# Patient Record
Sex: FEMALE | Race: WHITE | NOT HISPANIC OR LATINO | Employment: OTHER | URBAN - METROPOLITAN AREA
[De-identification: names, ages, dates, MRNs, and addresses within clinical notes are randomized per-mention and may not be internally consistent; named-entity substitution may affect disease eponyms.]

---

## 2017-09-28 RX ORDER — LISINOPRIL AND HYDROCHLOROTHIAZIDE 20; 25 MG/1; MG/1
1 TABLET ORAL DAILY
Qty: 30 TABLET | Refills: 0 | Status: SHIPPED | OUTPATIENT
Start: 2017-09-28 | End: 2017-10-22 | Stop reason: SDUPTHER

## 2017-10-23 RX ORDER — LISINOPRIL AND HYDROCHLOROTHIAZIDE 20; 25 MG/1; MG/1
TABLET ORAL
Qty: 30 TABLET | Refills: 0 | Status: SHIPPED | OUTPATIENT
Start: 2017-10-23 | End: 2017-11-24 | Stop reason: SDUPTHER

## 2017-11-24 RX ORDER — LISINOPRIL AND HYDROCHLOROTHIAZIDE 20; 25 MG/1; MG/1
TABLET ORAL
Qty: 30 TABLET | Refills: 0 | Status: SHIPPED | OUTPATIENT
Start: 2017-11-24 | End: 2018-02-06

## 2017-11-24 RX ORDER — LISINOPRIL AND HYDROCHLOROTHIAZIDE 20; 25 MG/1; MG/1
TABLET ORAL
Qty: 30 TABLET | Refills: 0 | Status: SHIPPED | OUTPATIENT
Start: 2017-11-24 | End: 2018-02-06 | Stop reason: SDUPTHER

## 2018-02-01 RX ORDER — LISINOPRIL AND HYDROCHLOROTHIAZIDE 20; 25 MG/1; MG/1
TABLET ORAL
Qty: 30 TABLET | Refills: 0 | OUTPATIENT
Start: 2018-02-01

## 2018-02-06 ENCOUNTER — OFFICE VISIT (OUTPATIENT)
Dept: PRIMARY CARE CLINIC | Facility: CLINIC | Age: 63
End: 2018-02-06
Payer: MEDICAID

## 2018-02-06 VITALS
OXYGEN SATURATION: 97 % | SYSTOLIC BLOOD PRESSURE: 160 MMHG | HEIGHT: 63 IN | DIASTOLIC BLOOD PRESSURE: 80 MMHG | BODY MASS INDEX: 48.49 KG/M2 | WEIGHT: 273.69 LBS | HEART RATE: 80 BPM | RESPIRATION RATE: 18 BRPM | TEMPERATURE: 99 F

## 2018-02-06 DIAGNOSIS — M17.0 PRIMARY OSTEOARTHRITIS OF BOTH KNEES: ICD-10-CM

## 2018-02-06 DIAGNOSIS — E66.9 OBESITY, UNSPECIFIED CLASSIFICATION, UNSPECIFIED OBESITY TYPE, UNSPECIFIED WHETHER SERIOUS COMORBIDITY PRESENT: ICD-10-CM

## 2018-02-06 DIAGNOSIS — M51.16 LUMBAR DISC DISEASE WITH RADICULOPATHY: ICD-10-CM

## 2018-02-06 DIAGNOSIS — I10 ESSENTIAL HYPERTENSION: Primary | ICD-10-CM

## 2018-02-06 DIAGNOSIS — Z12.11 COLON CANCER SCREENING: ICD-10-CM

## 2018-02-06 DIAGNOSIS — H25.9 AGE-RELATED CATARACT OF BOTH EYES, UNSPECIFIED AGE-RELATED CATARACT TYPE: ICD-10-CM

## 2018-02-06 DIAGNOSIS — J30.9 CHRONIC ALLERGIC RHINITIS, UNSPECIFIED SEASONALITY, UNSPECIFIED TRIGGER: ICD-10-CM

## 2018-02-06 PROCEDURE — 3008F BODY MASS INDEX DOCD: CPT | Mod: ,,, | Performed by: INTERNAL MEDICINE

## 2018-02-06 PROCEDURE — 99215 OFFICE O/P EST HI 40 MIN: CPT | Mod: PBBFAC,PN | Performed by: INTERNAL MEDICINE

## 2018-02-06 PROCEDURE — 99999 PR PBB SHADOW E&M-EST. PATIENT-LVL V: CPT | Mod: PBBFAC,,, | Performed by: INTERNAL MEDICINE

## 2018-02-06 PROCEDURE — 99213 OFFICE O/P EST LOW 20 MIN: CPT | Mod: S$PBB,,, | Performed by: INTERNAL MEDICINE

## 2018-02-06 RX ORDER — FUROSEMIDE 20 MG/1
20 TABLET ORAL DAILY
COMMUNITY
End: 2021-09-28

## 2018-02-06 RX ORDER — IBUPROFEN 800 MG/1
800 TABLET ORAL EVERY 12 HOURS PRN
COMMUNITY
End: 2018-02-06 | Stop reason: SDUPTHER

## 2018-02-06 RX ORDER — IBUPROFEN 800 MG/1
800 TABLET ORAL EVERY 12 HOURS PRN
Qty: 60 TABLET | Refills: 5 | Status: ON HOLD | OUTPATIENT
Start: 2018-02-06 | End: 2021-10-02 | Stop reason: HOSPADM

## 2018-02-06 RX ORDER — LISINOPRIL AND HYDROCHLOROTHIAZIDE 20; 25 MG/1; MG/1
1 TABLET ORAL DAILY
Qty: 90 TABLET | Refills: 3 | Status: SHIPPED | OUTPATIENT
Start: 2018-02-06 | End: 2019-01-17 | Stop reason: SDUPTHER

## 2018-02-06 RX ORDER — CETIRIZINE HYDROCHLORIDE 10 MG/1
10 TABLET, CHEWABLE ORAL DAILY
COMMUNITY
End: 2018-04-04 | Stop reason: SDUPTHER

## 2018-02-06 NOTE — PROGRESS NOTES
Subjective:       Patient ID: Paula Elmore is a 62 y.o. female.    Chief Complaint: Annual Exam    HPI  Pt is here for refill her meds used to see [facility] NP and [facility] for chronic back pain LS disc ds and severe OA knees with pain ortho told her not thing can be done no injection no knee repolacement  Review of Systems    Objective:      Physical Exam   Constitutional: She is oriented to person, place, and time. She appears well-developed and well-nourished. No distress.   overwt   HENT:   Head: Normocephalic and atraumatic.   Right Ear: External ear normal.   Left Ear: External ear normal.   Nose: Nose normal.   Mouth/Throat: Oropharynx is clear and moist. No oropharyngeal exudate.   Eyes: Conjunctivae and EOM are normal. Pupils are equal, round, and reactive to light. Right eye exhibits no discharge. Left eye exhibits no discharge.   Neck: Normal range of motion. Neck supple. No thyromegaly present.   Cardiovascular: Normal rate, regular rhythm, normal heart sounds and intact distal pulses.  Exam reveals no gallop and no friction rub.    No murmur heard.  Pulmonary/Chest: Effort normal and breath sounds normal. No respiratory distress. She has no wheezes. She has no rales. She exhibits no tenderness.   Abdominal: Soft. Bowel sounds are normal. She exhibits no distension. There is no tenderness. There is no rebound and no guarding.   Musculoskeletal: Normal range of motion. She exhibits tenderness (tenderness both knees L>R and lower back pain). She exhibits no edema or deformity.   Lymphadenopathy:     She has no cervical adenopathy.   Neurological: She is alert and oriented to person, place, and time.   Skin: Skin is warm and dry. Capillary refill takes less than 2 seconds. No rash noted. No erythema.   Psychiatric: She has a normal mood and affect. Judgment and thought content normal.   Nursing note and vitals reviewed.      Assessment:       1. Essential hypertension    2. Age-related cataract of both eyes,  unspecified age-related cataract type    3. Obesity, unspecified classification, unspecified obesity type, unspecified whether serious comorbidity present    4. Primary osteoarthritis of both knees    5. Lumbar disc disease with radiculopathy    6. Chronic allergic rhinitis, unspecified seasonality, unspecified trigger    7. Colon cancer screening        Plan:       Essential hypertension  -     lisinopril-hydrochlorothiazide (PRINZIDE,ZESTORETIC) 20-25 mg Tab; Take 1 tablet by mouth once daily.  Dispense: 90 tablet; Refill: 3    Age-related cataract of both eyes, unspecified age-related cataract type    Obesity, unspecified classification, unspecified obesity type, unspecified whether serious comorbidity present    Primary osteoarthritis of both knees  -     ibuprofen (ADVIL,MOTRIN) 800 MG tablet; Take 1 tablet (800 mg total) by mouth every 12 (twelve) hours as needed for Pain.  Dispense: 60 tablet; Refill: 5  -     Ambulatory Referral to Orthopedics    Lumbar disc disease with radiculopathy  -     Ambulatory Referral to Orthopedics    Chronic allergic rhinitis, unspecified seasonality, unspecified trigger    Colon cancer screening  Comments:  send felisha

## 2018-04-04 DIAGNOSIS — J30.9 CHRONIC ALLERGIC RHINITIS, UNSPECIFIED SEASONALITY, UNSPECIFIED TRIGGER: ICD-10-CM

## 2018-04-04 RX ORDER — CETIRIZINE HYDROCHLORIDE 10 MG/1
10 TABLET, CHEWABLE ORAL DAILY
Qty: 30 TABLET | Refills: 5 | Status: SHIPPED | OUTPATIENT
Start: 2018-04-04 | End: 2018-04-06 | Stop reason: SDUPTHER

## 2018-04-06 DIAGNOSIS — J30.9 CHRONIC ALLERGIC RHINITIS, UNSPECIFIED SEASONALITY, UNSPECIFIED TRIGGER: ICD-10-CM

## 2018-04-06 NOTE — TELEPHONE ENCOUNTER
----- Message from Jennifer Dhillon sent at 4/6/2018  2:06 PM CDT -----  Contact: Gale from [facility] [employer]   Calling to get refill Rx cetirizine 10 mg chewable tablet, Take 10 mg by mouth once daily to be sent to     [facility] [id number] - [place] - [address] AT [facility] [address]  [address] [place] [address]  Phone: [phone] Fax: [phone]

## 2018-04-08 RX ORDER — CETIRIZINE HYDROCHLORIDE 10 MG/1
10 TABLET, CHEWABLE ORAL DAILY
Qty: 30 TABLET | Refills: 1 | Status: SHIPPED | OUTPATIENT
Start: 2018-04-08 | End: 2022-02-02

## 2019-01-17 ENCOUNTER — OFFICE VISIT (OUTPATIENT)
Dept: PRIMARY CARE CLINIC | Facility: CLINIC | Age: 64
End: 2019-01-17
Payer: MEDICARE

## 2019-01-17 VITALS
TEMPERATURE: 98 F | OXYGEN SATURATION: 96 % | HEIGHT: 63 IN | HEART RATE: 65 BPM | WEIGHT: 275 LBS | SYSTOLIC BLOOD PRESSURE: 137 MMHG | RESPIRATION RATE: 18 BRPM | BODY MASS INDEX: 48.73 KG/M2 | DIASTOLIC BLOOD PRESSURE: 58 MMHG

## 2019-01-17 DIAGNOSIS — M17.0 BILATERAL PRIMARY OSTEOARTHRITIS OF KNEE: ICD-10-CM

## 2019-01-17 DIAGNOSIS — J30.2 SEASONAL ALLERGIES: ICD-10-CM

## 2019-01-17 DIAGNOSIS — G62.9 POLYNEUROPATHY: ICD-10-CM

## 2019-01-17 DIAGNOSIS — I10 ESSENTIAL HYPERTENSION: Primary | ICD-10-CM

## 2019-01-17 DIAGNOSIS — Z11.59 ENCOUNTER FOR HCV SCREENING TEST FOR LOW RISK PATIENT: ICD-10-CM

## 2019-01-17 DIAGNOSIS — Z13.6 ENCOUNTER FOR SCREENING FOR CARDIOVASCULAR DISORDERS: ICD-10-CM

## 2019-01-17 PROCEDURE — 99999 PR PBB SHADOW E&M-EST. PATIENT-LVL IV: ICD-10-PCS | Mod: PBBFAC,,, | Performed by: NURSE PRACTITIONER

## 2019-01-17 PROCEDURE — 99214 OFFICE O/P EST MOD 30 MIN: CPT | Mod: S$PBB,,, | Performed by: NURSE PRACTITIONER

## 2019-01-17 PROCEDURE — 99999 PR PBB SHADOW E&M-EST. PATIENT-LVL IV: CPT | Mod: PBBFAC,,, | Performed by: NURSE PRACTITIONER

## 2019-01-17 PROCEDURE — 99214 PR OFFICE/OUTPT VISIT, EST, LEVL IV, 30-39 MIN: ICD-10-PCS | Mod: S$PBB,,, | Performed by: NURSE PRACTITIONER

## 2019-01-17 PROCEDURE — 99214 OFFICE O/P EST MOD 30 MIN: CPT | Mod: PBBFAC,PN | Performed by: NURSE PRACTITIONER

## 2019-01-17 RX ORDER — LISINOPRIL AND HYDROCHLOROTHIAZIDE 20; 25 MG/1; MG/1
1 TABLET ORAL DAILY
Qty: 90 TABLET | Refills: 3 | Status: SHIPPED | OUTPATIENT
Start: 2019-01-17 | End: 2019-01-17 | Stop reason: SDUPTHER

## 2019-01-17 RX ORDER — LISINOPRIL AND HYDROCHLOROTHIAZIDE 20; 25 MG/1; MG/1
1 TABLET ORAL DAILY
Qty: 90 TABLET | Refills: 1 | Status: SHIPPED | OUTPATIENT
Start: 2019-01-17 | End: 2019-07-31 | Stop reason: SDUPTHER

## 2019-01-17 NOTE — PROGRESS NOTES
Chief Complaint  Chief Complaint   Patient presents with    Medication Refill       HPI    Paula Elmore is a 63 y.o. female with multiple medical diagnoses as listed in the medical history and problem list that presents for medication refill.  Patient presents to clinic requesting medication refill on current dosage of lisinopril at this time.  Previous patient of Angela Redding seen in the clinic approximately 1 year ago expresses displeasure with her that she was treated in the clinic.  Patient plans to establish with [facility] primary care in the near future requesting refills on her medications take area over until she can get in with a new primary care physician.  Patient not interested in discussing any upcoming health maintenance needs for lab work at this time.    Hypertension-reports compliance with current dosage Prinzide 20-25 mg daily.  Not currently checking blood pressures at home.  Presents to clinic with blood pressure within normal limits.  No chest pain or palpitations.  No recent lab work.    PAST MEDICAL HISTORY:  Past Medical History:   Diagnosis Date    Arthritis     Carpal tunnel syndrome     Cataracts, bilateral     Hypertension     Peripheral neuropathy     Seasonal allergies        PAST SURGICAL HISTORY:  Past Surgical History:   Procedure Laterality Date    APPENDECTOMY      CATARACT EXTRACTION       SECTION, CLASSIC      CHOLECYSTECTOMY         SOCIAL HISTORY:  Social History     Socioeconomic History    Marital status:      Spouse name: Not on file    Number of children: Not on file    Years of education: Not on file    Highest education level: Not on file   Social Needs    Financial resource strain: Not on file    Food insecurity - worry: Not on file    Food insecurity - inability: Not on file    Transportation needs - medical: Not on file    Transportation needs - non-medical: Not on file   Occupational History    Not on file   Tobacco Use  "   Smoking status: Never Smoker    Smokeless tobacco: Never Used   Substance and Sexual Activity    Alcohol use: No    Drug use: No    Sexual activity: Not on file   Other Topics Concern    Not on file   Social History Narrative    Not on file       FAMILY HISTORY:  Family History   Problem Relation Age of Onset    Heart disease Mother     Cancer Mother     Heart disease Father        ALLERGIES AND MEDICATIONS: updated and reviewed.  Review of patient's allergies indicates:   Allergen Reactions    Celebrex [celecoxib] Other (See Comments)     Headaches     Current Outpatient Medications   Medication Sig Dispense Refill    cetirizine 10 mg chewable tablet Take 10 mg by mouth once daily. 30 tablet 1    furosemide (LASIX) 20 MG tablet Take 20 mg by mouth once daily.      ibuprofen (ADVIL,MOTRIN) 800 MG tablet Take 1 tablet (800 mg total) by mouth every 12 (twelve) hours as needed for Pain. 60 tablet 5    lisinopril-hydrochlorothiazide (PRINZIDE,ZESTORETIC) 20-25 mg Tab Take 1 tablet by mouth once daily. 90 tablet 1    methocarbamol (ROBAXIN) 500 MG Tab Take 500 mg by mouth 2 (two) times daily as needed.        No current facility-administered medications for this visit.          ROS  Review of Systems   Constitutional: Negative for chills and fever.   HENT: Negative for ear pain, postnasal drip and sinus pain.    Respiratory: Negative for cough and shortness of breath.    Cardiovascular: Positive for leg swelling. Negative for chest pain.   Gastrointestinal: Negative for diarrhea, nausea and vomiting.   Musculoskeletal: Positive for arthralgias, back pain, gait problem and myalgias.   Neurological: Positive for numbness.           PHYSICAL EXAM  Vitals:    01/17/19 0951   BP: (!) 137/58   BP Location: Right arm   Patient Position: Sitting   BP Method: Medium (Automatic)   Pulse: 65   Resp: 18   Temp: 98 °F (36.7 °C)   TempSrc: Oral   SpO2: 96%   Weight: 124.7 kg (275 lb)   Height: 5' 3" (1.6 m)    Body " "mass index is 48.71 kg/m².  Weight: 124.7 kg (275 lb)   Height: 5' 3" (160 cm)     Physical Exam   Constitutional: She is oriented to person, place, and time. She appears well-developed and well-nourished.   HENT:   Head: Normocephalic.   Right Ear: Tympanic membrane normal.   Left Ear: Tympanic membrane normal.   Mouth/Throat: Uvula is midline, oropharynx is clear and moist and mucous membranes are normal.   Eyes: Conjunctivae are normal.   Cardiovascular: Normal rate, regular rhythm and normal pulses.   Murmur heard.   Systolic murmur is present with a grade of 2/6.  Pulses:       Radial pulses are 2+ on the right side, and 2+ on the left side.   +1 LE swelling noted   Pulmonary/Chest: Effort normal and breath sounds normal. She has no wheezes.   Abdominal: Soft. Bowel sounds are normal. There is no tenderness.   Musculoskeletal: She exhibits no edema.   Lymphadenopathy:     She has no cervical adenopathy.   Neurological: She is alert and oriented to person, place, and time.   Skin: Skin is warm and dry. No rash noted.   Psychiatric: She has a normal mood and affect.         Health Maintenance       Date Due Completion Date    Hepatitis C Screening 1955 ---    Lipid Panel 1955 ---    TETANUS VACCINE 12/26/1971 ---    Mammogram 12/26/1993 ---    DEXA SCAN 12/26/1993 ---    Colonoscopy 12/26/2003 ---    Zoster Vaccine 12/26/2013 ---    Influenza Vaccine 08/01/2018 ---    Pneumococcal Vaccine (65+ Low/Medium Risk) (1 of 2 - PCV13) 12/26/2018 ---            Assessment & Plan    Problem List Items Addressed This Visit        Unprioritized    Bilateral primary osteoarthritis of knee    Seasonal allergies    Essential hypertension - Primary    Relevant Medications    lisinopril-hydrochlorothiazide (PRINZIDE,ZESTORETIC) 20-25 mg Tab  Discussed with patient that I have no problem treating her needs until she can establish with a new primary care physician.  Plans to establish with Dr. Silva in the next month " to complete lab work and health maintenance.      Other Relevant Orders    CBC auto differential    Comprehensive metabolic panel    TSH    Polyneuropathy      Other Visit Diagnoses     Encounter for screening for cardiovascular disorders        Relevant Orders    Lipid panel    Encounter for HCV screening test for low risk patient        Relevant Orders    Hepatitis C antibody          Follow-up: Follow-up if symptoms worsen or fail to improve.    Renu Jacobarslan       Medication List           Accurate as of 1/17/19 10:21 AM. If you have any questions, ask your nurse or doctor.               CONTINUE taking these medications    cetirizine 10 mg chewable tablet  Take 10 mg by mouth once daily.     furosemide 20 MG tablet  Commonly known as:  LASIX     ibuprofen 800 MG tablet  Commonly known as:  ADVIL,MOTRIN  Take 1 tablet (800 mg total) by mouth every 12 (twelve) hours as needed for Pain.     lisinopril-hydrochlorothiazide 20-25 mg Tab  Commonly known as:  PRINZIDE,ZESTORETIC  Take 1 tablet by mouth once daily.     methocarbamol 500 MG Tab  Commonly known as:  ROBAXIN           Where to Get Your Medications      These medications were sent to [facility] [id number]  [place] - [address] AT [facility] [address]  [address] [address]    Phone:  [phone]   · lisinopril-hydrochlorothiazide 20-25 mg Tab

## 2019-07-31 DIAGNOSIS — I10 ESSENTIAL HYPERTENSION: ICD-10-CM

## 2019-07-31 RX ORDER — LISINOPRIL AND HYDROCHLOROTHIAZIDE 20; 25 MG/1; MG/1
TABLET ORAL
Qty: 30 TABLET | Refills: 0 | Status: SHIPPED | OUTPATIENT
Start: 2019-07-31 | End: 2019-07-31 | Stop reason: SDUPTHER

## 2019-08-01 RX ORDER — LISINOPRIL AND HYDROCHLOROTHIAZIDE 20; 25 MG/1; MG/1
TABLET ORAL
Qty: 90 TABLET | Refills: 0 | Status: SHIPPED | OUTPATIENT
Start: 2019-08-01 | End: 2021-09-28

## 2021-09-28 PROBLEM — I48.91 ATRIAL FIBRILLATION WITH RVR: Status: ACTIVE | Noted: 2021-09-28

## 2022-02-20 PROBLEM — E66.9 OBESITY, CLASS II, BMI 35-39.9: Chronic | Status: ACTIVE | Noted: 2022-02-20

## 2022-02-20 PROBLEM — I48.91 ATRIAL FIBRILLATION WITH RVR: Status: ACTIVE | Noted: 2022-02-20

## 2022-02-21 PROBLEM — I48.0 PAROXYSMAL A-FIB: Chronic | Status: ACTIVE | Noted: 2022-02-21

## 2022-02-21 PROBLEM — R00.1 BRADYCARDIA: Status: ACTIVE | Noted: 2022-02-21

## 2022-02-21 PROBLEM — I48.91 ATRIAL FIBRILLATION WITH RVR: Status: RESOLVED | Noted: 2022-02-20 | Resolved: 2022-02-21

## 2022-04-21 ENCOUNTER — TELEPHONE (OUTPATIENT)
Dept: ORTHOPEDICS | Facility: CLINIC | Age: 67
End: 2022-04-21
Payer: COMMERCIAL

## 2022-04-21 DIAGNOSIS — M25.561 PAIN IN BOTH KNEES, UNSPECIFIED CHRONICITY: Primary | ICD-10-CM

## 2022-04-21 DIAGNOSIS — M25.562 PAIN IN BOTH KNEES, UNSPECIFIED CHRONICITY: Primary | ICD-10-CM

## 2022-04-21 NOTE — TELEPHONE ENCOUNTER
Spoke with pt. Advised pt that she will need xrays of both knees prior to her appt. Images ordered and scheduled. All questions answered. Pt verbalized understanding.

## 2022-04-22 ENCOUNTER — OFFICE VISIT (OUTPATIENT)
Dept: ORTHOPEDICS | Facility: CLINIC | Age: 67
End: 2022-04-22
Payer: COMMERCIAL

## 2022-04-22 VITALS — WEIGHT: 191.81 LBS | BODY MASS INDEX: 33.98 KG/M2 | HEIGHT: 63 IN | RESPIRATION RATE: 18 BRPM

## 2022-04-22 DIAGNOSIS — M17.0 BILATERAL PRIMARY OSTEOARTHRITIS OF KNEE: Primary | ICD-10-CM

## 2022-04-22 PROCEDURE — 1159F PR MEDICATION LIST DOCUMENTED IN MEDICAL RECORD: ICD-10-PCS | Mod: CPTII,S$GLB,, | Performed by: ORTHOPAEDIC SURGERY

## 2022-04-22 PROCEDURE — 99203 OFFICE O/P NEW LOW 30 MIN: CPT | Mod: S$GLB,,, | Performed by: ORTHOPAEDIC SURGERY

## 2022-04-22 PROCEDURE — 3008F BODY MASS INDEX DOCD: CPT | Mod: CPTII,S$GLB,, | Performed by: ORTHOPAEDIC SURGERY

## 2022-04-22 PROCEDURE — 1159F MED LIST DOCD IN RCRD: CPT | Mod: CPTII,S$GLB,, | Performed by: ORTHOPAEDIC SURGERY

## 2022-04-22 PROCEDURE — 4010F ACE/ARB THERAPY RXD/TAKEN: CPT | Mod: CPTII,S$GLB,, | Performed by: ORTHOPAEDIC SURGERY

## 2022-04-22 PROCEDURE — 99999 PR PBB SHADOW E&M-EST. PATIENT-LVL III: ICD-10-PCS | Mod: PBBFAC,,, | Performed by: ORTHOPAEDIC SURGERY

## 2022-04-22 PROCEDURE — 1101F PR PT FALLS ASSESS DOC 0-1 FALLS W/OUT INJ PAST YR: ICD-10-PCS | Mod: CPTII,S$GLB,, | Performed by: ORTHOPAEDIC SURGERY

## 2022-04-22 PROCEDURE — 99999 PR PBB SHADOW E&M-EST. PATIENT-LVL III: CPT | Mod: PBBFAC,,, | Performed by: ORTHOPAEDIC SURGERY

## 2022-04-22 PROCEDURE — 99203 PR OFFICE/OUTPT VISIT, NEW, LEVL III, 30-44 MIN: ICD-10-PCS | Mod: S$GLB,,, | Performed by: ORTHOPAEDIC SURGERY

## 2022-04-22 PROCEDURE — 3288F FALL RISK ASSESSMENT DOCD: CPT | Mod: CPTII,S$GLB,, | Performed by: ORTHOPAEDIC SURGERY

## 2022-04-22 PROCEDURE — 4010F PR ACE/ARB THEARPY RXD/TAKEN: ICD-10-PCS | Mod: CPTII,S$GLB,, | Performed by: ORTHOPAEDIC SURGERY

## 2022-04-22 PROCEDURE — 1160F RVW MEDS BY RX/DR IN RCRD: CPT | Mod: CPTII,S$GLB,, | Performed by: ORTHOPAEDIC SURGERY

## 2022-04-22 PROCEDURE — 3008F PR BODY MASS INDEX (BMI) DOCUMENTED: ICD-10-PCS | Mod: CPTII,S$GLB,, | Performed by: ORTHOPAEDIC SURGERY

## 2022-04-22 PROCEDURE — 99213 OFFICE O/P EST LOW 20 MIN: CPT | Mod: PBBFAC,PN | Performed by: ORTHOPAEDIC SURGERY

## 2022-04-22 PROCEDURE — 1125F AMNT PAIN NOTED PAIN PRSNT: CPT | Mod: CPTII,S$GLB,, | Performed by: ORTHOPAEDIC SURGERY

## 2022-04-22 PROCEDURE — 1101F PT FALLS ASSESS-DOCD LE1/YR: CPT | Mod: CPTII,S$GLB,, | Performed by: ORTHOPAEDIC SURGERY

## 2022-04-22 PROCEDURE — 1125F PR PAIN SEVERITY QUANTIFIED, PAIN PRESENT: ICD-10-PCS | Mod: CPTII,S$GLB,, | Performed by: ORTHOPAEDIC SURGERY

## 2022-04-22 PROCEDURE — 1160F PR REVIEW ALL MEDS BY PRESCRIBER/CLIN PHARMACIST DOCUMENTED: ICD-10-PCS | Mod: CPTII,S$GLB,, | Performed by: ORTHOPAEDIC SURGERY

## 2022-04-22 PROCEDURE — 3288F PR FALLS RISK ASSESSMENT DOCUMENTED: ICD-10-PCS | Mod: CPTII,S$GLB,, | Performed by: ORTHOPAEDIC SURGERY

## 2022-04-22 RX ORDER — LISINOPRIL 10 MG/1
10 TABLET ORAL DAILY
Status: ON HOLD | COMMUNITY
Start: 2022-04-15 | End: 2023-09-19 | Stop reason: HOSPADM

## 2022-04-22 RX ORDER — NAPROXEN 375 MG/1
375 TABLET ORAL 2 TIMES DAILY
COMMUNITY
Start: 2022-03-31 | End: 2022-05-14

## 2022-04-22 RX ORDER — AMIODARONE HYDROCHLORIDE 200 MG/1
200 TABLET ORAL 2 TIMES DAILY
COMMUNITY
Start: 2022-04-15 | End: 2022-07-30

## 2022-04-22 NOTE — PROGRESS NOTES
Subjective:    Patient ID:  Paula Elmore is a 66 y.o. y.o. female who presents for initial visit for Pain of the Right Knee and Pain of the Left Knee      65 yo female reports longstanding h/o gradual progressive bilateral knee pain secondary to osteoarthritis, right more symptomatic than left. Pain localized to anterior knee, aggravated with walking bending and stair climbing. Notes associated stiffness and function-limiting pain at short distances. Endorses chronic LBP with intermittent radicular symptoms. Ambulates with walker prn. Denies swelling, night/rest pain or constitutional sxs. States she recently completed PT for knees with some improvement. Currently taking Naprosyn.            Past Medical History:   Diagnosis Date    Afib 2021    Afib     Arthritis     Carpal tunnel syndrome     Cataracts, bilateral     Hypertension     Peripheral neuropathy     Seasonal allergies     SVT (supraventricular tachycardia) 2021        Past Surgical History:   Procedure Laterality Date    APPENDECTOMY      CATARACT EXTRACTION       SECTION, CLASSIC      CHOLECYSTECTOMY         Review of patient's allergies indicates:   Allergen Reactions    Celebrex [celecoxib] Other (See Comments)     Headaches          Current Outpatient Medications:     amiodarone (PACERONE) 200 MG Tab, Take 200 mg by mouth 2 (two) times daily., Disp: , Rfl:     amLODIPine (NORVASC) 5 MG tablet, Take 5 mg by mouth once daily., Disp: , Rfl:     chlorthalidone (HYGROTEN) 25 MG Tab, Take 25 mg by mouth once daily., Disp: , Rfl:     lisinopriL 10 MG tablet, Take 10 mg by mouth once daily., Disp: , Rfl:     naproxen (NAPROSYN) 375 MG tablet, Take 375 mg by mouth 2 (two) times daily., Disp: , Rfl:     rivaroxaban (XARELTO) 20 mg Tab, Take 1 tablet (20 mg total) by mouth daily with dinner or evening meal., Disp: 30 tablet, Rfl: 3    flecainide (TAMBOCOR) 100 MG Tab, Take 1 tablet (100 mg total) by mouth every 12  "(twelve) hours. (Patient not taking: Reported on 4/22/2022), Disp: 60 tablet, Rfl: 11    Social History     Socioeconomic History    Marital status:    Tobacco Use    Smoking status: Never Smoker    Smokeless tobacco: Never Used   Substance and Sexual Activity    Alcohol use: No    Drug use: No        Family History   Problem Relation Age of Onset    Heart disease Mother     Cancer Mother     Heart disease Father         Review of Systems   Constitutional: Negative for chills and fever.   HENT: Negative for hearing loss.    Eyes: Negative for blurred vision.   Respiratory: Negative for shortness of breath.    Cardiovascular: Negative for chest pain.   Gastrointestinal: Negative for nausea and vomiting.   Genitourinary: Negative for dysuria.   Musculoskeletal: Negative for myalgias.   Skin: Negative for rash.   Neurological: Negative for speech change and loss of consciousness.   Endo/Heme/Allergies: Does not bruise/bleed easily.   Psychiatric/Behavioral: Negative for depression.        Objective:     Resp 18   Ht 5' 3" (1.6 m)   Wt 87 kg (191 lb 12.8 oz)   LMP  (LMP Unknown)   BMI 33.98 kg/m²     Ortho Exam     65 yo female in NAD; alert, oriented x 3    Head: atraumatic  Eyes: EOM are normal. Right eye exhibits no discharge. Left eye exhibits no discharge  Cardiovascular: normal rate    Pulmonary/Chest: effort normal; no respiratory distress  Abdominal: soft    BLE: N/V intact    Bilateral hip: NT; FROM; negative Stinchfield    Bilateral knee: no effusion; PF crepitus; tender MJL/LJL; ROM: 0-95 flexion (right), -5 to 95 flexion (left); no gross ligamentous laxity      Imaging:     X-rays standing AP, lateral/sunrise bilateral knee taken today are independently reviewed by me and show Kellgren Adarsh grade 4 tricompartmental degenerative change, valgus right/varus left.      Assessment & Plan:      1. Bilateral primary osteoarthritis of knee       1.  Findings, diagnosis, treatment " options/risks/benefits were reviewed. Patient expressed interest in proceeding with elective staged bilateral TKR. However, she prefers to have her surgery done in [place] and states she plans to discuss orthopedic referral there with her PCP.

## 2022-05-14 ENCOUNTER — HOSPITAL ENCOUNTER (OUTPATIENT)
Facility: HOSPITAL | Age: 67
Discharge: HOME | End: 2022-05-16
Attending: EMERGENCY MEDICINE | Admitting: INTERNAL MEDICINE
Payer: COMMERCIAL

## 2022-05-14 DIAGNOSIS — R07.9 CHEST PAIN: ICD-10-CM

## 2022-05-14 DIAGNOSIS — R07.89 OTHER CHEST PAIN: ICD-10-CM

## 2022-05-14 DIAGNOSIS — R53.1 WEAKNESS: Primary | ICD-10-CM

## 2022-05-14 DIAGNOSIS — R07.9 CHEST PAIN, UNSPECIFIED: ICD-10-CM

## 2022-05-14 DIAGNOSIS — E83.42 HYPOMAGNESEMIA: ICD-10-CM

## 2022-05-14 PROBLEM — F34.1 DYSTHYMIA: Status: ACTIVE | Noted: 2022-05-14

## 2022-05-14 LAB
ALBUMIN SERPL BCP-MCNC: 3.4 G/DL (ref 3.5–5.2)
ALP SERPL-CCNC: 53 U/L (ref 55–135)
ALT SERPL W/O P-5'-P-CCNC: 12 U/L (ref 10–44)
ANION GAP SERPL CALC-SCNC: 12 MMOL/L (ref 8–16)
AST SERPL-CCNC: 15 U/L (ref 10–40)
BASOPHILS # BLD AUTO: 0.03 K/UL (ref 0–0.2)
BASOPHILS NFR BLD: 0.3 % (ref 0–1.9)
BILIRUB SERPL-MCNC: 0.5 MG/DL (ref 0.1–1)
BILIRUB UR QL STRIP: NEGATIVE
BNP SERPL-MCNC: 22 PG/ML (ref 0–99)
BUN SERPL-MCNC: 21 MG/DL (ref 8–23)
CALCIUM SERPL-MCNC: 8.8 MG/DL (ref 8.7–10.5)
CHLORIDE SERPL-SCNC: 98 MMOL/L (ref 95–110)
CLARITY UR: CLEAR
CO2 SERPL-SCNC: 24 MMOL/L (ref 23–29)
COLOR UR: YELLOW
CREAT SERPL-MCNC: 0.7 MG/DL (ref 0.5–1.4)
DIFFERENTIAL METHOD: ABNORMAL
EOSINOPHIL # BLD AUTO: 0 K/UL (ref 0–0.5)
EOSINOPHIL NFR BLD: 0.3 % (ref 0–8)
ERYTHROCYTE [DISTWIDTH] IN BLOOD BY AUTOMATED COUNT: 15.4 % (ref 11.5–14.5)
EST. GFR  (AFRICAN AMERICAN): >60 ML/MIN/1.73 M^2
EST. GFR  (NON AFRICAN AMERICAN): >60 ML/MIN/1.73 M^2
ESTIMATED AVG GLUCOSE: 108 MG/DL (ref 68–131)
GLUCOSE SERPL-MCNC: 101 MG/DL (ref 70–110)
GLUCOSE UR QL STRIP: NEGATIVE
HBA1C MFR BLD: 5.4 % (ref 4.5–6.2)
HCT VFR BLD AUTO: 39.9 % (ref 37–48.5)
HGB BLD-MCNC: 13.1 G/DL (ref 12–16)
HGB UR QL STRIP: NEGATIVE
IMM GRANULOCYTES # BLD AUTO: 0.02 K/UL (ref 0–0.04)
IMM GRANULOCYTES NFR BLD AUTO: 0.2 % (ref 0–0.5)
INFLUENZA A, MOLECULAR: NEGATIVE
INFLUENZA B, MOLECULAR: NEGATIVE
KETONES UR QL STRIP: NEGATIVE
LEUKOCYTE ESTERASE UR QL STRIP: NEGATIVE
LYMPHOCYTES # BLD AUTO: 0.7 K/UL (ref 1–4.8)
LYMPHOCYTES NFR BLD: 7.1 % (ref 18–48)
MAGNESIUM SERPL-MCNC: 1.5 MG/DL (ref 1.6–2.6)
MCH RBC QN AUTO: 26.7 PG (ref 27–31)
MCHC RBC AUTO-ENTMCNC: 32.8 G/DL (ref 32–36)
MCV RBC AUTO: 81 FL (ref 82–98)
MONOCYTES # BLD AUTO: 0.4 K/UL (ref 0.3–1)
MONOCYTES NFR BLD: 4.8 % (ref 4–15)
NEUTROPHILS # BLD AUTO: 8.1 K/UL (ref 1.8–7.7)
NEUTROPHILS NFR BLD: 87.3 % (ref 38–73)
NITRITE UR QL STRIP: NEGATIVE
NRBC BLD-RTO: 0 /100 WBC
PH UR STRIP: 7 [PH] (ref 5–8)
PLATELET # BLD AUTO: 241 K/UL (ref 150–450)
PMV BLD AUTO: 9.9 FL (ref 9.2–12.9)
POTASSIUM SERPL-SCNC: 3.1 MMOL/L (ref 3.5–5.1)
PROT SERPL-MCNC: 6.7 G/DL (ref 6–8.4)
PROT UR QL STRIP: NEGATIVE
RBC # BLD AUTO: 4.9 M/UL (ref 4–5.4)
SARS-COV-2 RDRP RESP QL NAA+PROBE: NEGATIVE
SODIUM SERPL-SCNC: 134 MMOL/L (ref 136–145)
SP GR UR STRIP: 1.02 (ref 1–1.03)
SPECIMEN SOURCE: NORMAL
TROPONIN I SERPL DL<=0.01 NG/ML-MCNC: <0.03 NG/ML
URN SPEC COLLECT METH UR: NORMAL
UROBILINOGEN UR STRIP-ACNC: NEGATIVE EU/DL
WBC # BLD AUTO: 9.24 K/UL (ref 3.9–12.7)

## 2022-05-14 PROCEDURE — 83735 ASSAY OF MAGNESIUM: CPT | Performed by: EMERGENCY MEDICINE

## 2022-05-14 PROCEDURE — 87502 INFLUENZA DNA AMP PROBE: CPT | Performed by: EMERGENCY MEDICINE

## 2022-05-14 PROCEDURE — 81003 URINALYSIS AUTO W/O SCOPE: CPT | Performed by: EMERGENCY MEDICINE

## 2022-05-14 PROCEDURE — 36415 COLL VENOUS BLD VENIPUNCTURE: CPT | Performed by: INTERNAL MEDICINE

## 2022-05-14 PROCEDURE — 63600175 PHARM REV CODE 636 W HCPCS: Performed by: EMERGENCY MEDICINE

## 2022-05-14 PROCEDURE — 93010 ELECTROCARDIOGRAM REPORT: CPT | Mod: ,,, | Performed by: GENERAL PRACTICE

## 2022-05-14 PROCEDURE — U0002 COVID-19 LAB TEST NON-CDC: HCPCS | Performed by: EMERGENCY MEDICINE

## 2022-05-14 PROCEDURE — 93005 ELECTROCARDIOGRAM TRACING: CPT | Performed by: GENERAL PRACTICE

## 2022-05-14 PROCEDURE — G0378 HOSPITAL OBSERVATION PER HR: HCPCS

## 2022-05-14 PROCEDURE — 25000003 PHARM REV CODE 250: Performed by: INTERNAL MEDICINE

## 2022-05-14 PROCEDURE — 84484 ASSAY OF TROPONIN QUANT: CPT | Mod: 91 | Performed by: EMERGENCY MEDICINE

## 2022-05-14 PROCEDURE — 96375 TX/PRO/DX INJ NEW DRUG ADDON: CPT | Mod: 59

## 2022-05-14 PROCEDURE — 85025 COMPLETE CBC W/AUTO DIFF WBC: CPT | Performed by: EMERGENCY MEDICINE

## 2022-05-14 PROCEDURE — 84484 ASSAY OF TROPONIN QUANT: CPT | Performed by: INTERNAL MEDICINE

## 2022-05-14 PROCEDURE — 96365 THER/PROPH/DIAG IV INF INIT: CPT

## 2022-05-14 PROCEDURE — 99285 EMERGENCY DEPT VISIT HI MDM: CPT | Mod: 25

## 2022-05-14 PROCEDURE — 63600175 PHARM REV CODE 636 W HCPCS: Performed by: INTERNAL MEDICINE

## 2022-05-14 PROCEDURE — 83036 HEMOGLOBIN GLYCOSYLATED A1C: CPT | Performed by: INTERNAL MEDICINE

## 2022-05-14 PROCEDURE — 93010 EKG 12-LEAD: ICD-10-PCS | Mod: ,,, | Performed by: GENERAL PRACTICE

## 2022-05-14 PROCEDURE — 25000003 PHARM REV CODE 250: Performed by: EMERGENCY MEDICINE

## 2022-05-14 PROCEDURE — 83880 ASSAY OF NATRIURETIC PEPTIDE: CPT | Performed by: EMERGENCY MEDICINE

## 2022-05-14 PROCEDURE — 80053 COMPREHEN METABOLIC PANEL: CPT | Performed by: EMERGENCY MEDICINE

## 2022-05-14 RX ORDER — POTASSIUM CHLORIDE 20 MEQ/1
20 TABLET, EXTENDED RELEASE ORAL
Status: DISCONTINUED | OUTPATIENT
Start: 2022-05-14 | End: 2022-05-16 | Stop reason: HOSPADM

## 2022-05-14 RX ORDER — POTASSIUM CHLORIDE 20 MEQ/1
40 TABLET, EXTENDED RELEASE ORAL
Status: DISCONTINUED | OUTPATIENT
Start: 2022-05-14 | End: 2022-05-16 | Stop reason: HOSPADM

## 2022-05-14 RX ORDER — MAGNESIUM SULFATE 1 G/100ML
1 INJECTION INTRAVENOUS ONCE
Status: COMPLETED | OUTPATIENT
Start: 2022-05-14 | End: 2022-05-14

## 2022-05-14 RX ORDER — ENOXAPARIN SODIUM 100 MG/ML
40 INJECTION SUBCUTANEOUS EVERY 24 HOURS
Status: DISCONTINUED | OUTPATIENT
Start: 2022-05-14 | End: 2022-05-14

## 2022-05-14 RX ORDER — AMLODIPINE BESYLATE 5 MG/1
5 TABLET ORAL DAILY
Status: DISCONTINUED | OUTPATIENT
Start: 2022-05-14 | End: 2022-05-15

## 2022-05-14 RX ORDER — POTASSIUM CHLORIDE 20 MEQ/1
40 TABLET, EXTENDED RELEASE ORAL
Status: COMPLETED | OUTPATIENT
Start: 2022-05-14 | End: 2022-05-14

## 2022-05-14 RX ORDER — ONDANSETRON 2 MG/ML
4 INJECTION INTRAMUSCULAR; INTRAVENOUS EVERY 8 HOURS PRN
Status: DISCONTINUED | OUTPATIENT
Start: 2022-05-14 | End: 2022-05-16 | Stop reason: HOSPADM

## 2022-05-14 RX ORDER — CITALOPRAM 20 MG/1
20 TABLET, FILM COATED ORAL DAILY
Status: DISCONTINUED | OUTPATIENT
Start: 2022-05-14 | End: 2022-05-16 | Stop reason: HOSPADM

## 2022-05-14 RX ORDER — TALC
6 POWDER (GRAM) TOPICAL NIGHTLY PRN
Status: DISCONTINUED | OUTPATIENT
Start: 2022-05-14 | End: 2022-05-16 | Stop reason: HOSPADM

## 2022-05-14 RX ORDER — METOPROLOL SUCCINATE 25 MG/1
25 TABLET, EXTENDED RELEASE ORAL DAILY
Status: DISCONTINUED | OUTPATIENT
Start: 2022-05-14 | End: 2022-05-15

## 2022-05-14 RX ORDER — MAGNESIUM SULFATE HEPTAHYDRATE 40 MG/ML
2 INJECTION, SOLUTION INTRAVENOUS
Status: DISCONTINUED | OUTPATIENT
Start: 2022-05-14 | End: 2022-05-16 | Stop reason: HOSPADM

## 2022-05-14 RX ORDER — ACETAMINOPHEN 325 MG/1
650 TABLET ORAL EVERY 8 HOURS PRN
Status: DISCONTINUED | OUTPATIENT
Start: 2022-05-14 | End: 2022-05-16 | Stop reason: HOSPADM

## 2022-05-14 RX ORDER — MAGNESIUM SULFATE HEPTAHYDRATE 40 MG/ML
4 INJECTION, SOLUTION INTRAVENOUS
Status: DISCONTINUED | OUTPATIENT
Start: 2022-05-14 | End: 2022-05-16 | Stop reason: HOSPADM

## 2022-05-14 RX ORDER — LISINOPRIL 10 MG/1
10 TABLET ORAL DAILY
Status: DISCONTINUED | OUTPATIENT
Start: 2022-05-14 | End: 2022-05-16 | Stop reason: HOSPADM

## 2022-05-14 RX ORDER — CHLORTHALIDONE 25 MG/1
25 TABLET ORAL DAILY
Status: DISCONTINUED | OUTPATIENT
Start: 2022-05-14 | End: 2022-05-16 | Stop reason: HOSPADM

## 2022-05-14 RX ORDER — LANOLIN ALCOHOL/MO/W.PET/CERES
800 CREAM (GRAM) TOPICAL
Status: DISCONTINUED | OUTPATIENT
Start: 2022-05-14 | End: 2022-05-16 | Stop reason: HOSPADM

## 2022-05-14 RX ORDER — AMIODARONE HYDROCHLORIDE 200 MG/1
200 TABLET ORAL 2 TIMES DAILY
Status: DISCONTINUED | OUTPATIENT
Start: 2022-05-14 | End: 2022-05-15

## 2022-05-14 RX ORDER — MAGNESIUM SULFATE 1 G/100ML
1 INJECTION INTRAVENOUS
Status: DISCONTINUED | OUTPATIENT
Start: 2022-05-14 | End: 2022-05-16 | Stop reason: HOSPADM

## 2022-05-14 RX ADMIN — MELATONIN 6 MG: at 09:05

## 2022-05-14 RX ADMIN — RIVAROXABAN 20 MG: 20 TABLET, FILM COATED ORAL at 04:05

## 2022-05-14 RX ADMIN — POTASSIUM CHLORIDE 40 MEQ: 20 TABLET, EXTENDED RELEASE ORAL at 11:05

## 2022-05-14 RX ADMIN — CITALOPRAM HYDROBROMIDE 20 MG: 20 TABLET ORAL at 03:05

## 2022-05-14 RX ADMIN — LISINOPRIL 10 MG: 10 TABLET ORAL at 03:05

## 2022-05-14 RX ADMIN — AMIODARONE HYDROCHLORIDE 200 MG: 200 TABLET ORAL at 09:05

## 2022-05-14 RX ADMIN — MAGNESIUM SULFATE IN DEXTROSE 1 G: 10 INJECTION, SOLUTION INTRAVENOUS at 11:05

## 2022-05-14 RX ADMIN — CHLORTHALIDONE 25 MG: 25 TABLET ORAL at 03:05

## 2022-05-14 RX ADMIN — ONDANSETRON 4 MG: 2 INJECTION INTRAMUSCULAR; INTRAVENOUS at 07:05

## 2022-05-14 RX ADMIN — AMLODIPINE BESYLATE 5 MG: 5 TABLET ORAL at 03:05

## 2022-05-14 NOTE — ED NOTES
"Pt complains of palpitations, chest tightness 4/10 since last night and reports that last night she was belching, stressed and had an "afib attack" but that all of that resolved last night, she reports she had ems check her out last night but did not want to come to the ED. Pt denies any other symptoms, pt is aao x4, safety intact, telemetry present, will continue to monitor.  "

## 2022-05-14 NOTE — HPI
"66 year old female with history of PAF, PVC, HTN, and has had 1/3 COVID vaccinations presented to ED complaining of central chest aching (6/10) for the past 7-10 days comes and goes of own accord, is associated with SOB, and fatigue; "Sometimes goes to my left shoulder". Is scheduled for stress and ECHO in 1 month. Became worried and called EMS last PM, but decided not to come to hospital. This AM when occurred again she did come.     Also, the patient is not sleeping at night time, is fatigued "All the time", lives alone in [place]--daughter lives in [place]--has a friend, who lives in [place]; she becomes anxious and cries easily. She denied suicidal and/or homicidal ideation. No thought disorder, nor paranoid thinking. Has not been on any psychiatric medications. Feels "Sad and depressed".    In ED: labs reviewed: normal CBC; trivial hyponatremia, hypokalemia and hypomagnesemia (both treated in ED--and sliding scale started) with normal renal function; Cardiac biomarkers are normal. Flu and COVID are negative. CXR: NAPD. EKG: sinus nathalie with inferolateral T wave flattening or inversion, but no acute segments.   "

## 2022-05-14 NOTE — H&P
"[facility] - Emergency Dept  Hospital Medicine  History & Physical    Patient Name: Paula Elmore  MRN: [id number]  Patient Class: OP- Observation  Admission Date: 5/14/2022  Attending Physician: Justin Burns MD  Primary Care Provider: Ortiz Jarrell MD         Patient information was obtained from patient and ER records.     Subjective:     Principal Problem:Dysthymia    Chief Complaint:   Chief Complaint   Patient presents with    Fatigue     Patient reports fatigue and generalized weakness x 3 days         HPI: 66 year old female with history of PAF, PVC, HTN, and has had 1/3 COVID vaccinations presented to ED complaining of central chest aching (6/10) for the past 7-10 days comes and goes of own accord, is associated with SOB, and fatigue; "Sometimes goes to my left shoulder". Is scheduled for stress and ECHO in 1 month. Became worried and called EMS last PM, but decided not to come to hospital. This AM when occurred again she did come.     Also, the patient is not sleeping at night time, is fatigued "All the time", lives alone in [place]--daughter lives in [place]--has a friend, who lives in [place]; she becomes anxious and cries easily. She denied suicidal and/or homicidal ideation. No thought disorder, nor paranoid thinking. Has not been on any psychiatric medications. Feels "Sad and depressed".    In ED: labs reviewed: normal CBC; trivial hyponatremia, hypokalemia and hypomagnesemia (both treated in ED--and sliding scale started) with normal renal function; Cardiac biomarkers are normal. Flu and COVID are negative. CXR: NAPD. EKG: sinus nathalie with inferolateral T wave flattening or inversion, but no acute segments.     Currently the patient is chest pain free.    Past Medical History:   Diagnosis Date    Afib 09/28/2021    Afib     Arthritis     Carpal tunnel syndrome     Cataracts, bilateral     Hypertension     Peripheral neuropathy     Seasonal allergies     SVT " (supraventricular tachycardia) 2021       Past Surgical History:   Procedure Laterality Date    APPENDECTOMY      CATARACT EXTRACTION       SECTION, CLASSIC      CHOLECYSTECTOMY         Review of patient's allergies indicates:   Allergen Reactions    Celebrex [celecoxib] Other (See Comments)     Headaches       No current facility-administered medications on file prior to encounter.     Current Outpatient Medications on File Prior to Encounter   Medication Sig    amiodarone (PACERONE) 200 MG Tab Take 200 mg by mouth 2 (two) times daily.    amLODIPine (NORVASC) 5 MG tablet Take 5 mg by mouth once daily.    chlorthalidone (HYGROTEN) 25 MG Tab Take 25 mg by mouth once daily.    lisinopriL 10 MG tablet Take 10 mg by mouth once daily.    rivaroxaban (XARELTO) 20 mg Tab Take 1 tablet (20 mg total) by mouth daily with dinner or evening meal.    [DISCONTINUED] flecainide (TAMBOCOR) 100 MG Tab Take 1 tablet (100 mg total) by mouth every 12 (twelve) hours. (Patient not taking: No sig reported)    [DISCONTINUED] hydroCHLOROthiazide (HYDRODIURIL) 25 MG tablet TAKE 1 TABLET BY MOUTH EVERY DAY    [DISCONTINUED] metoprolol succinate (TOPROL-XL) 25 MG 24 hr tablet Take 1 tablet (25 mg total) by mouth once daily.    [DISCONTINUED] naproxen (NAPROSYN) 375 MG tablet Take 375 mg by mouth 2 (two) times daily.     Family History       Problem Relation (Age of Onset)    Cancer Mother    Heart disease Mother, Father          Tobacco Use    Smoking status: Never Smoker    Smokeless tobacco: Never Used   Substance and Sexual Activity    Alcohol use: No    Drug use: No    Sexual activity: Not on file     Review of Systems   Constitutional:  Positive for fatigue.   HENT: Negative.     Eyes: Negative.    Respiratory:  Positive for shortness of breath.    Cardiovascular:  Positive for chest pain.   Gastrointestinal: Negative.    Endocrine: Negative.    Genitourinary: Negative.    Musculoskeletal: Negative.     Skin: Negative.    Allergic/Immunologic: Negative.    Neurological: Negative.    Hematological: Negative.    Psychiatric/Behavioral:  Positive for dysphoric mood.    All other systems reviewed and are negative.  Objective:     Vital Signs (Most Recent):  Temp: 98.7 °F (37.1 °C) (05/14/22 1001)  Pulse: 68 (05/14/22 1156)  Resp: 19 (05/14/22 1156)  BP: 135/63 (05/14/22 1201)  SpO2: 98 % (05/14/22 1156) Vital Signs (24h Range):  Temp:  [98.7 °F (37.1 °C)] 98.7 °F (37.1 °C)  Pulse:  [61-68] 68  Resp:  [14-19] 19  SpO2:  [98 %-100 %] 98 %  BP: (131-144)/(62-68) 135/63        There is no height or weight on file to calculate BMI.    Physical Exam  Vitals and nursing note reviewed.   Constitutional:       Appearance: She is well-developed. She is obese.   HENT:      Head: Normocephalic and atraumatic.      Right Ear: External ear normal.      Left Ear: External ear normal.      Nose: Nose normal.   Eyes:      Conjunctiva/sclera: Conjunctivae normal.      Pupils: Pupils are equal, round, and reactive to light.   Cardiovascular:      Rate and Rhythm: Normal rate and regular rhythm.      Heart sounds: Normal heart sounds.   Pulmonary:      Effort: Pulmonary effort is normal.      Breath sounds: Normal breath sounds.      Comments: Decreased entry without adventitious sounds  Abdominal:      General: Bowel sounds are normal.      Palpations: Abdomen is soft.   Musculoskeletal:         General: Normal range of motion.      Cervical back: Normal range of motion and neck supple.   Skin:     General: Skin is warm and dry.      Capillary Refill: Capillary refill takes less than 2 seconds.   Neurological:      Mental Status: She is alert and oriented to person, place, and time.   Psychiatric:         Behavior: Behavior normal.         Thought Content: Thought content normal.         Judgment: Judgment normal.         CRANIAL NERVES     CN III, IV, VI   Pupils are equal, round, and reactive to light.     Significant Labs: All  pertinent labs within the past 24 hours have been reviewed.  CBC:   Recent Labs   Lab 05/14/22  1015   WBC 9.24   HGB 13.1   HCT 39.9        CMP:   Recent Labs   Lab 05/14/22  1015   *   K 3.1*   CL 98   CO2 24      BUN 21   CREATININE 0.7   CALCIUM 8.8   PROT 6.7   ALBUMIN 3.4*   BILITOT 0.5   ALKPHOS 53*   AST 15   ALT 12   ANIONGAP 12   EGFRNONAA >60.0     Cardiac Markers:   Recent Labs   Lab 05/14/22  1015   BNP 22     Troponin:   Recent Labs   Lab 05/14/22  1015   TROPONINI <0.030       Significant Imaging: I have reviewed all pertinent imaging results/findings within the past 24 hours.    Assessment/Plan:     * Dysthymia  TSH with AM labs for review; Celexa 20 mg q day with outaptient follow-up with PCP. Discussed with patient that SSRI will take weeks for full effect      Chest pain  [employer]; ECHO; Stress myoview: Cardiology        VTE Risk Mitigation (From admission, onward)    None             Justin Burns MD  Department of Hospital Medicine   [facility] - Emergency Dept

## 2022-05-14 NOTE — ED PROVIDER NOTES
Encounter Date: 2022       History     Chief Complaint   Patient presents with    Fatigue     Patient reports fatigue and generalized weakness x 3 days      Patient here with reported generalized weakness and fatigue over last 3 days developed chest pain today is very mild heavy feeling her upper portion of her chest no significant shortness of breath no nausea vomiting no diaphoresis patient does have a history of atrial fibrillation no known coronary disease she has a history of hypertension peripheral neuropathy B she denies any cough no fever chills no nausea vomiting or diarrhea        Review of patient's allergies indicates:   Allergen Reactions    Celebrex [celecoxib] Other (See Comments)     Headaches     Past Medical History:   Diagnosis Date    Afib 2021    Afib     Arthritis     Carpal tunnel syndrome     Cataracts, bilateral     Hypertension     Peripheral neuropathy     Seasonal allergies     SVT (supraventricular tachycardia) 2021     Past Surgical History:   Procedure Laterality Date    APPENDECTOMY      CATARACT EXTRACTION       SECTION, CLASSIC      CHOLECYSTECTOMY       Family History   Problem Relation Age of Onset    Heart disease Mother     Cancer Mother     Heart disease Father      Social History     Tobacco Use    Smoking status: Never Smoker    Smokeless tobacco: Never Used   Substance Use Topics    Alcohol use: No    Drug use: No     Review of Systems   Constitutional: Positive for fatigue. Negative for appetite change, chills and diaphoresis.   HENT: Negative.    Respiratory: Negative for shortness of breath.    Cardiovascular: Positive for chest pain and leg swelling.   Gastrointestinal: Positive for constipation. Negative for abdominal pain, nausea and vomiting.   Genitourinary: Negative for dysuria and flank pain.   Musculoskeletal: Negative for back pain.   Skin: Negative for rash.   Neurological: Negative for light-headedness.        Physical Exam     Initial Vitals [05/14/22 1001]   BP Pulse Resp Temp SpO2   131/62 61 18 98.7 °F (37.1 °C) 100 %      MAP       --         Physical Exam    Constitutional: She appears well-developed and well-nourished. No distress.   HENT:   Head: Normocephalic and atraumatic.   Right Ear: External ear normal.   Left Ear: External ear normal.   Mouth/Throat: Oropharynx is clear and moist.   Eyes: Conjunctivae and EOM are normal. Pupils are equal, round, and reactive to light.   Neck: Neck supple.   Normal range of motion.  Cardiovascular: Normal rate, regular rhythm, normal heart sounds and intact distal pulses.   Pulmonary/Chest: Breath sounds normal. No respiratory distress.   Abdominal: Abdomen is soft. Bowel sounds are normal. There is no abdominal tenderness.   Musculoskeletal:         General: No edema. Normal range of motion.      Cervical back: Normal range of motion and neck supple.     Neurological: She is alert and oriented to person, place, and time. She has normal strength. GCS score is 15. GCS eye subscore is 4. GCS verbal subscore is 5. GCS motor subscore is 6.   Skin: Skin is warm and dry. Capillary refill takes less than 2 seconds. No rash noted.   Psychiatric: She has a normal mood and affect. Her behavior is normal.         ED Course   Procedures  Labs Reviewed   CBC W/ AUTO DIFFERENTIAL - Abnormal; Notable for the following components:       Result Value    MCV 81 (*)     MCH 26.7 (*)     RDW 15.4 (*)     Gran # (ANC) 8.1 (*)     Lymph # 0.7 (*)     Gran % 87.3 (*)     Lymph % 7.1 (*)     All other components within normal limits   COMPREHENSIVE METABOLIC PANEL - Abnormal; Notable for the following components:    Sodium 134 (*)     Potassium 3.1 (*)     Albumin 3.4 (*)     Alkaline Phosphatase 53 (*)     All other components within normal limits   MAGNESIUM - Abnormal; Notable for the following components:    Magnesium 1.5 (*)     All other components within normal limits   TROPONIN I    B-TYPE NATRIURETIC PEPTIDE   SARS-COV-2 RNA AMPLIFICATION, QUAL   INFLUENZA A AND B ANTIGEN    Narrative:     Specimen Source->Nasopharyngeal Swab   URINALYSIS, REFLEX TO URINE CULTURE        ECG Results          EKG 12-lead (In process)  Result time 05/14/22 10:20:15    In process by Interface, Lab In [facility] (05/14/22 10:20:15)                 Narrative:    Test Reason : R07.9,    Vent. Rate : 055 BPM     Atrial Rate : 055 BPM     P-R Int : 152 ms          QRS Dur : 094 ms      QT Int : 432 ms       P-R-T Axes : 043 001 049 degrees     QTc Int : 413 ms    Sinus bradycardia  T wave abnormality, consider inferolateral ischemia  Abnormal ECG  When compared with ECG of 27-MAR-2022 03:44,  T wave inversion now evident in Inferior leads  T wave inversion more evident in Lateral leads    Referred By: [email or web address]   SELF           Confirmed By:                             Imaging Results          X-Ray Chest AP Portable (Final result)  Result time 05/14/22 10:39:51    Final result by Suad Hayes MD (05/14/22 10:39:51)                 Narrative:    Portal chest x-ray at 10:21 AM is compared to prior study dated 3/27/2022    Clinical history is chest pain    The lungs are clear. Cardiomediastinal silhouette is normal in size. There are no acute osseous abnormalities. There are mild degenerative changes of the AC joints.    IMPRESSION: No acute pulmonary process    Electronically signed by:  Suad Hayes MD  5/14/2022 10:39 AM CDT Workstation: [id number]                               Medications   potassium chloride SA CR tablet 40 mEq (40 mEq Oral Given 5/14/22 1153)   magnesium sulfate in dextrose IVPB (premix) 1 g (1 g Intravenous New Bag 5/14/22 1153)     Medical Decision Making:   ED Management:  EKG has flattened T-waves compared to previous EKGs but no ST elevation laboratory evaluation reviewed she is found to be hypomagnesemic gave her magnesium IV hypokalemic she has received potassium p.o. I discussed  patient's findings with the hospitalist who will evaluate patient emergency department for admission                      Clinical Impression:   Final diagnoses:  [R07.9] Chest pain  [R53.1] Weakness (Primary)  [E83.42] Hypomagnesemia          ED Disposition Condition    Admit               Matias Storm MD  05/14/22 9765

## 2022-05-14 NOTE — SUBJECTIVE & OBJECTIVE
Past Medical History:   Diagnosis Date    Afib 2021    Afib     Arthritis     Carpal tunnel syndrome     Cataracts, bilateral     Hypertension     Peripheral neuropathy     Seasonal allergies     SVT (supraventricular tachycardia) 2021       Past Surgical History:   Procedure Laterality Date    APPENDECTOMY      CATARACT EXTRACTION       SECTION, CLASSIC      CHOLECYSTECTOMY         Review of patient's allergies indicates:   Allergen Reactions    Celebrex [celecoxib] Other (See Comments)     Headaches       No current facility-administered medications on file prior to encounter.     Current Outpatient Medications on File Prior to Encounter   Medication Sig    amiodarone (PACERONE) 200 MG Tab Take 200 mg by mouth 2 (two) times daily.    amLODIPine (NORVASC) 5 MG tablet Take 5 mg by mouth once daily.    chlorthalidone (HYGROTEN) 25 MG Tab Take 25 mg by mouth once daily.    lisinopriL 10 MG tablet Take 10 mg by mouth once daily.    rivaroxaban (XARELTO) 20 mg Tab Take 1 tablet (20 mg total) by mouth daily with dinner or evening meal.    [DISCONTINUED] flecainide (TAMBOCOR) 100 MG Tab Take 1 tablet (100 mg total) by mouth every 12 (twelve) hours. (Patient not taking: No sig reported)    [DISCONTINUED] hydroCHLOROthiazide (HYDRODIURIL) 25 MG tablet TAKE 1 TABLET BY MOUTH EVERY DAY    [DISCONTINUED] metoprolol succinate (TOPROL-XL) 25 MG 24 hr tablet Take 1 tablet (25 mg total) by mouth once daily.    [DISCONTINUED] naproxen (NAPROSYN) 375 MG tablet Take 375 mg by mouth 2 (two) times daily.     Family History       Problem Relation (Age of Onset)    Cancer Mother    Heart disease Mother, Father          Tobacco Use    Smoking status: Never Smoker    Smokeless tobacco: Never Used   Substance and Sexual Activity    Alcohol use: No    Drug use: No    Sexual activity: Not on file     Review of Systems   Constitutional:  Positive for fatigue.   HENT: Negative.     Eyes: Negative.    Respiratory:  Positive  for shortness of breath.    Cardiovascular:  Positive for chest pain.   Gastrointestinal: Negative.    Endocrine: Negative.    Genitourinary: Negative.    Musculoskeletal: Negative.    Skin: Negative.    Allergic/Immunologic: Negative.    Neurological: Negative.    Hematological: Negative.    Psychiatric/Behavioral:  Positive for dysphoric mood.    All other systems reviewed and are negative.  Objective:     Vital Signs (Most Recent):  Temp: 98.7 °F (37.1 °C) (05/14/22 1001)  Pulse: 68 (05/14/22 1156)  Resp: 19 (05/14/22 1156)  BP: 135/63 (05/14/22 1201)  SpO2: 98 % (05/14/22 1156) Vital Signs (24h Range):  Temp:  [98.7 °F (37.1 °C)] 98.7 °F (37.1 °C)  Pulse:  [61-68] 68  Resp:  [14-19] 19  SpO2:  [98 %-100 %] 98 %  BP: (131-144)/(62-68) 135/63        There is no height or weight on file to calculate BMI.    Physical Exam  Vitals and nursing note reviewed.   Constitutional:       Appearance: She is well-developed. She is obese.   HENT:      Head: Normocephalic and atraumatic.      Right Ear: External ear normal.      Left Ear: External ear normal.      Nose: Nose normal.   Eyes:      Conjunctiva/sclera: Conjunctivae normal.      Pupils: Pupils are equal, round, and reactive to light.   Cardiovascular:      Rate and Rhythm: Normal rate and regular rhythm.      Heart sounds: Normal heart sounds.   Pulmonary:      Effort: Pulmonary effort is normal.      Breath sounds: Normal breath sounds.      Comments: Decreased entry without adventitious sounds  Abdominal:      General: Bowel sounds are normal.      Palpations: Abdomen is soft.   Musculoskeletal:         General: Normal range of motion.      Cervical back: Normal range of motion and neck supple.   Skin:     General: Skin is warm and dry.      Capillary Refill: Capillary refill takes less than 2 seconds.   Neurological:      Mental Status: She is alert and oriented to person, place, and time.   Psychiatric:         Behavior: Behavior normal.         Thought Content:  Thought content normal.         Judgment: Judgment normal.         CRANIAL NERVES     CN III, IV, VI   Pupils are equal, round, and reactive to light.     Significant Labs: All pertinent labs within the past 24 hours have been reviewed.  CBC:   Recent Labs   Lab 05/14/22  1015   WBC 9.24   HGB 13.1   HCT 39.9        CMP:   Recent Labs   Lab 05/14/22  1015   *   K 3.1*   CL 98   CO2 24      BUN 21   CREATININE 0.7   CALCIUM 8.8   PROT 6.7   ALBUMIN 3.4*   BILITOT 0.5   ALKPHOS 53*   AST 15   ALT 12   ANIONGAP 12   EGFRNONAA >60.0     Cardiac Markers:   Recent Labs   Lab 05/14/22  1015   BNP 22     Troponin:   Recent Labs   Lab 05/14/22  1015   TROPONINI <0.030       Significant Imaging: I have reviewed all pertinent imaging results/findings within the past 24 hours.

## 2022-05-14 NOTE — ASSESSMENT & PLAN NOTE
TSH with AM labs for review; Celexa 20 mg q day with outaptient follow-up with PCP. Discussed with patient that SSRI will take weeks for full effect

## 2022-05-15 ENCOUNTER — HOSPITAL ENCOUNTER (OUTPATIENT)
Dept: RADIOLOGY | Facility: HOSPITAL | Age: 67
Discharge: HOME | End: 2022-05-15
Attending: INTERNAL MEDICINE
Payer: COMMERCIAL

## 2022-05-15 ENCOUNTER — CLINICAL SUPPORT (OUTPATIENT)
Dept: CARDIOLOGY | Facility: HOSPITAL | Age: 67
End: 2022-05-15
Attending: INTERNAL MEDICINE
Payer: COMMERCIAL

## 2022-05-15 VITALS — BODY MASS INDEX: 34.18 KG/M2 | HEIGHT: 63 IN | WEIGHT: 192.88 LBS

## 2022-05-15 PROBLEM — E87.6 HYPOKALEMIA: Status: ACTIVE | Noted: 2022-05-15

## 2022-05-15 LAB
ANION GAP SERPL CALC-SCNC: 10 MMOL/L (ref 8–16)
AV INDEX (PROSTH): 0.84
AV MEAN GRADIENT: 7 MMHG
AV VALVE AREA: 3.34 CM2
BSA FOR ECHO PROCEDURE: 1.97 M2
BUN SERPL-MCNC: 14 MG/DL (ref 8–23)
CALCIUM SERPL-MCNC: 8.5 MG/DL (ref 8.7–10.5)
CHLORIDE SERPL-SCNC: 98 MMOL/L (ref 95–110)
CO2 SERPL-SCNC: 28 MMOL/L (ref 23–29)
CREAT SERPL-MCNC: 0.6 MG/DL (ref 0.5–1.4)
CV ECHO LV RWT: 0.62 CM
DOP CALC AO VTI: 37.46 CM
DOP CALC LVOT AREA: 4 CM2
DOP CALC LVOT DIAMETER: 2.25 CM
DOP CALC LVOT PEAK VEL: 141.29 M/S
DOP CALC LVOT STROKE VOLUME: 124.98 CM3
DOP CALCLVOT PEAK VEL VTI: 31.45 CM
E WAVE DECELERATION TIME: 236.2 MSEC
E/A RATIO: 0.94
E/E' RATIO: 11.38 M/S
ECHO LV POSTERIOR WALL: 1.24 CM (ref 0.6–1.1)
EJECTION FRACTION: 58 %
EST. GFR  (AFRICAN AMERICAN): >60 ML/MIN/1.73 M^2
EST. GFR  (NON AFRICAN AMERICAN): >60 ML/MIN/1.73 M^2
FRACTIONAL SHORTENING: 23 % (ref 28–44)
GLUCOSE SERPL-MCNC: 100 MG/DL (ref 70–110)
INTERVENTRICULAR SEPTUM: 1.23 CM (ref 0.6–1.1)
IVRT: 99.45 MSEC
LEFT ATRIUM SIZE: 4.36 CM
LEFT INTERNAL DIMENSION IN SYSTOLE: 3.09 CM (ref 2.1–4)
LEFT VENTRICLE MASS INDEX: 92 G/M2
LEFT VENTRICULAR INTERNAL DIMENSION IN DIASTOLE: 4.03 CM (ref 3.5–6)
LEFT VENTRICULAR MASS: 174.63 G
LV LATERAL E/E' RATIO: 10.57 M/S
LV SEPTAL E/E' RATIO: 12.33 M/S
MAGNESIUM SERPL-MCNC: 1.8 MG/DL (ref 1.6–2.6)
MV PEAK A VEL: 0.79 M/S
MV PEAK E VEL: 0.74 M/S
PISA TR MAX VEL: 2.58 M/S
POTASSIUM SERPL-SCNC: 3.1 MMOL/L (ref 3.5–5.1)
POTASSIUM SERPL-SCNC: 3.2 MMOL/L (ref 3.5–5.1)
RA PRESSURE: 3 MMHG
RIGHT VENTRICULAR END-DIASTOLIC DIMENSION: 187 CM
SODIUM SERPL-SCNC: 136 MMOL/L (ref 136–145)
TDI LATERAL: 0.07 M/S
TDI SEPTAL: 0.06 M/S
TDI: 0.07 M/S
TR MAX PG: 27 MMHG
TRICUSPID ANNULAR PLANE SYSTOLIC EXCURSION: 280 CM
TSH SERPL DL<=0.005 MIU/L-ACNC: 2.71 UIU/ML (ref 0.34–5.6)
TV REST PULMONARY ARTERY PRESSURE: 30 MMHG

## 2022-05-15 PROCEDURE — 83735 ASSAY OF MAGNESIUM: CPT | Performed by: INTERNAL MEDICINE

## 2022-05-15 PROCEDURE — A9502 TC99M TETROFOSMIN: HCPCS

## 2022-05-15 PROCEDURE — 80048 BASIC METABOLIC PNL TOTAL CA: CPT | Performed by: INTERNAL MEDICINE

## 2022-05-15 PROCEDURE — 84132 ASSAY OF SERUM POTASSIUM: CPT | Performed by: INTERNAL MEDICINE

## 2022-05-15 PROCEDURE — 84443 ASSAY THYROID STIM HORMONE: CPT | Performed by: INTERNAL MEDICINE

## 2022-05-15 PROCEDURE — 93306 TTE W/DOPPLER COMPLETE: CPT | Mod: 26,,, | Performed by: INTERNAL MEDICINE

## 2022-05-15 PROCEDURE — 93306 ECHO (CUPID ONLY): ICD-10-PCS | Mod: 26,,, | Performed by: INTERNAL MEDICINE

## 2022-05-15 PROCEDURE — 63600175 PHARM REV CODE 636 W HCPCS: Performed by: INTERNAL MEDICINE

## 2022-05-15 PROCEDURE — 36415 COLL VENOUS BLD VENIPUNCTURE: CPT | Performed by: INTERNAL MEDICINE

## 2022-05-15 PROCEDURE — 25000003 PHARM REV CODE 250: Performed by: INTERNAL MEDICINE

## 2022-05-15 PROCEDURE — 93306 TTE W/DOPPLER COMPLETE: CPT

## 2022-05-15 PROCEDURE — G0378 HOSPITAL OBSERVATION PER HR: HCPCS

## 2022-05-15 PROCEDURE — 96375 TX/PRO/DX INJ NEW DRUG ADDON: CPT

## 2022-05-15 PROCEDURE — 96376 TX/PRO/DX INJ SAME DRUG ADON: CPT

## 2022-05-15 RX ORDER — ALPRAZOLAM 0.5 MG/1
0.5 TABLET ORAL 3 TIMES DAILY PRN
Status: DISCONTINUED | OUTPATIENT
Start: 2022-05-15 | End: 2022-05-16 | Stop reason: HOSPADM

## 2022-05-15 RX ADMIN — ONDANSETRON 4 MG: 2 INJECTION INTRAMUSCULAR; INTRAVENOUS at 09:05

## 2022-05-15 RX ADMIN — POTASSIUM CHLORIDE 40 MEQ: 1500 TABLET, EXTENDED RELEASE ORAL at 07:05

## 2022-05-15 RX ADMIN — AMIODARONE HYDROCHLORIDE 200 MG: 200 TABLET ORAL at 11:05

## 2022-05-15 RX ADMIN — PROMETHAZINE HYDROCHLORIDE 12.5 MG: 25 INJECTION INTRAMUSCULAR; INTRAVENOUS at 12:05

## 2022-05-15 RX ADMIN — LISINOPRIL 10 MG: 10 TABLET ORAL at 11:05

## 2022-05-15 RX ADMIN — MELATONIN 6 MG: at 09:05

## 2022-05-15 RX ADMIN — AMLODIPINE BESYLATE 5 MG: 5 TABLET ORAL at 11:05

## 2022-05-15 RX ADMIN — RIVAROXABAN 20 MG: 20 TABLET, FILM COATED ORAL at 05:05

## 2022-05-15 RX ADMIN — CITALOPRAM HYDROBROMIDE 20 MG: 20 TABLET ORAL at 11:05

## 2022-05-15 RX ADMIN — CHLORTHALIDONE 25 MG: 25 TABLET ORAL at 11:05

## 2022-05-15 NOTE — CONSULTS
"[facility]   Cardiology Note    Consult Requested By: hospital medicine  Reason for Consult: chest pain    SUBJECTIVE:     History of Present Illness: Patient is a 67 yo female with PMHx of PAfib on xarelto, HTN, PVCs who presented to the ED with complaints of weakness, dizziness, and fatigue. She states Friday she did not feel well and called EMS, her vitals were stable and she decided not to go to the hospital. However, Saturday morning she woke up with chest tightness that occurred intermittently throughout the day and decided to go to the ED. She describes the pain as a dull ache in the middle of her chest. She denies radiation or aggravating/alleviating factors. It was not associated with exertion. She reports associated palpitations and a "flushed feeling." She denies associated SOB, LE edema, N/V, diaphoresis, syncope. She has felt very anxious the past few days because she was not feeling well. She also reports feelings of depression recently, she was started on citalopram in the ED. We discussed following up with a PCP outpatient for this as well. Ed workup showed negative serial troponins, BNP normal, EKG showed TWI in inferior and lateral leads. This was seen on an EKG in clinic at her previous visit in April and appears to be new. She was scheduled for an echo and stress test in June. CXR showed no acute cardiopulmonary process. K 3.1, Mg 1.5, Na 134. Today she denies CP, SOB, LE edema, palpitations. She is scheduled to have a stress test and echo today.      Review of patient's allergies indicates:   Allergen Reactions    Celebrex [celecoxib] Other (See Comments)     Headaches       Past Medical History:   Diagnosis Date    Afib 09/28/2021    Afib     Arthritis     Carpal tunnel syndrome     Cataracts, bilateral     Hypertension     Peripheral neuropathy     Seasonal allergies     SVT (supraventricular tachycardia) 09/28/2021     Past Surgical History:   Procedure Laterality Date "    APPENDECTOMY      CATARACT EXTRACTION       SECTION, CLASSIC      CHOLECYSTECTOMY       Family History   Problem Relation Age of Onset    Heart disease Mother     Cancer Mother     Heart disease Father      Social History     Tobacco Use    Smoking status: Never Smoker    Smokeless tobacco: Never Used   Substance Use Topics    Alcohol use: No    Drug use: No       Review of Systems:  Review of Systems   Constitutional: Positive for malaise/fatigue. Negative for diaphoresis.   Respiratory: Negative for shortness of breath.    Cardiovascular: Positive for chest pain and palpitations. Negative for orthopnea and leg swelling.   Gastrointestinal: Negative for nausea and vomiting.   Neurological: Positive for dizziness and weakness.       OBJECTIVE:     Vital Signs (Most Recent)  Temp: 97.8 °F (36.6 °C) (05/15/22 0729)  Pulse: (!) 58 (05/15/22 0729)  Resp: 18 (05/15/22 0729)  BP: (!) 118/57 (05/15/22 0729)  SpO2: 100 % (05/15/22 0729)    Vital Signs Range (Last 24H):  Temp:  [97.8 °F (36.6 °C)-98.8 °F (37.1 °C)]   Pulse:  [54-73]   Resp:  [14-19]   BP: ()/(51-68)   SpO2:  [95 %-100 %]     I & O (Last 24H):    Intake/Output Summary (Last 24 hours) at 5/15/2022 0943  Last data filed at 5/15/2022 0731  Gross per 24 hour   Intake 100 ml   Output 700 ml   Net -600 ml       Current Diet:     Current Diet Order   Procedures    Diet NPO        Allergies:  Review of patient's allergies indicates:   Allergen Reactions    Celebrex [celecoxib] Other (See Comments)     Headaches       Meds:  Scheduled Meds:   amiodarone  200 mg Oral BID    amLODIPine  5 mg Oral Daily    chlorthalidone  25 mg Oral Daily    citalopram  20 mg Oral Daily    lisinopriL  10 mg Oral Daily    metoprolol succinate  25 mg Oral Daily    rivaroxaban  20 mg Oral Daily with dinner     Continuous Infusions:  PRN Meds:acetaminophen, calcium chloride IVPB, calcium chloride IVPB, calcium chloride IVPB, magnesium oxide, magnesium  sulfate IVPB, magnesium sulfate IVPB, magnesium sulfate IVPB, magnesium sulfate IVPB, melatonin, ondansetron, potassium chloride, potassium chloride, potassium chloride, potassium chloride, promethazine (PHENERGAN) IVPB    Oxygen/Ventilator Data (Last 24H):  (if applicable)        Hemodynamic Parameters (Last 24H):   (if applicable)        Laboratory and Radiology Data:  Recent Results (from the past 24 hour(s))   COVID-19 Rapid Screening    Collection Time: 05/14/22 10:08 AM   Result Value Ref Range    SARS-CoV-2 RNA, Amplification, Qual Negative Negative   Influenza antigen Nasopharyngeal Swab    Collection Time: 05/14/22 10:08 AM   Result Value Ref Range    Influenza A, Molecular Negative Negative    Influenza B, Molecular Negative Negative    Flu A & B Source NP    CBC auto differential    Collection Time: 05/14/22 10:15 AM   Result Value Ref Range    WBC 9.24 3.90 - 12.70 K/uL    RBC 4.90 4.00 - 5.40 M/uL    Hemoglobin 13.1 12.0 - 16.0 g/dL    Hematocrit 39.9 37.0 - 48.5 %    MCV 81 (L) 82 - 98 fL    MCH 26.7 (L) 27.0 - 31.0 pg    MCHC 32.8 32.0 - 36.0 g/dL    RDW 15.4 (H) 11.5 - 14.5 %    Platelets 241 150 - 450 K/uL    MPV 9.9 9.2 - 12.9 fL    Immature Granulocytes 0.2 0.0 - 0.5 %    Gran # (ANC) 8.1 (H) 1.8 - 7.7 K/uL    Immature Grans (Abs) 0.02 0.00 - 0.04 K/uL    Lymph # 0.7 (L) 1.0 - 4.8 K/uL    Mono # 0.4 0.3 - 1.0 K/uL    Eos # 0.0 0.0 - 0.5 K/uL    Baso # 0.03 0.00 - 0.20 K/uL    nRBC 0 0 /100 WBC    Gran % 87.3 (H) 38.0 - 73.0 %    Lymph % 7.1 (L) 18.0 - 48.0 %    Mono % 4.8 4.0 - 15.0 %    Eosinophil % 0.3 0.0 - 8.0 %    Basophil % 0.3 0.0 - 1.9 %    Differential Method Automated    Comprehensive metabolic panel    Collection Time: 05/14/22 10:15 AM   Result Value Ref Range    Sodium 134 (L) 136 - 145 mmol/L    Potassium 3.1 (L) 3.5 - 5.1 mmol/L    Chloride 98 95 - 110 mmol/L    CO2 24 23 - 29 mmol/L    Glucose 101 70 - 110 mg/dL    BUN 21 8 - 23 mg/dL    Creatinine 0.7 0.5 - 1.4 mg/dL    Calcium  8.8 8.7 - 10.5 mg/dL    Total Protein 6.7 6.0 - 8.4 g/dL    Albumin 3.4 (L) 3.5 - 5.2 g/dL    Total Bilirubin 0.5 0.1 - 1.0 mg/dL    Alkaline Phosphatase 53 (L) 55 - 135 U/L    AST 15 10 - 40 U/L    ALT 12 10 - 44 U/L    Anion Gap 12 8 - 16 mmol/L    eGFR if African American >60.0 >60 mL/min/1.73 m^2    eGFR if non African American >60.0 >60 mL/min/1.73 m^2   Troponin I #1    Collection Time: 05/14/22 10:15 AM   Result Value Ref Range    Troponin I <0.030 <=0.040 ng/mL   B-Type natriuretic peptide (BNP)    Collection Time: 05/14/22 10:15 AM   Result Value Ref Range    BNP 22 0 - 99 pg/mL   Magnesium    Collection Time: 05/14/22 10:15 AM   Result Value Ref Range    Magnesium 1.5 (L) 1.6 - 2.6 mg/dL   Urinalysis, Reflex to Urine Culture Urine, Clean Catch    Collection Time: 05/14/22 10:52 AM    Specimen: Urine   Result Value Ref Range    Specimen UA Urine, Clean Catch     Color, UA Yellow Yellow, Straw, Jamila    Appearance, UA Clear Clear    pH, UA 7.0 5.0 - 8.0    Specific Gravity, UA 1.020 1.005 - 1.030    Protein, UA Negative Negative    Glucose, UA Negative Negative    Ketones, UA Negative Negative    Bilirubin (UA) Negative Negative    Occult Blood UA Negative Negative    Nitrite, UA Negative Negative    Urobilinogen, UA Negative Negative EU/dL    Leukocytes, UA Negative Negative   Hemoglobin A1c    Collection Time: 05/14/22  2:55 PM   Result Value Ref Range    Hemoglobin A1C 5.4 4.5 - 6.2 %    Estimated Avg Glucose 108 68 - 131 mg/dL   Troponin I    Collection Time: 05/14/22  2:55 PM   Result Value Ref Range    Troponin I <0.030 <=0.040 ng/mL   Troponin I    Collection Time: 05/14/22  9:06 PM   Result Value Ref Range    Troponin I <0.030 <=0.040 ng/mL   Basic metabolic panel    Collection Time: 05/15/22  5:09 AM   Result Value Ref Range    Sodium 136 136 - 145 mmol/L    Potassium 3.1 (L) 3.5 - 5.1 mmol/L    Chloride 98 95 - 110 mmol/L    CO2 28 23 - 29 mmol/L    Glucose 100 70 - 110 mg/dL    BUN 14 8 - 23 mg/dL     Creatinine 0.6 0.5 - 1.4 mg/dL    Calcium 8.5 (L) 8.7 - 10.5 mg/dL    Anion Gap 10 8 - 16 mmol/L    eGFR if African American >60.0 >60 mL/min/1.73 m^2    eGFR if non African American >60.0 >60 mL/min/1.73 m^2   TSH    Collection Time: 05/15/22  5:09 AM   Result Value Ref Range    TSH 2.710 0.340 - 5.600 uIU/mL   Magnesium    Collection Time: 05/15/22  5:09 AM   Result Value Ref Range    Magnesium 1.8 1.6 - 2.6 mg/dL     Imaging Results          X-Ray Chest AP Portable (Final result)  Result time 05/14/22 10:39:51    Final result by Suad Hayes MD (05/14/22 10:39:51)                 Narrative:    Portal chest x-ray at 10:21 AM is compared to prior study dated 3/27/2022    Clinical history is chest pain    The lungs are clear. Cardiomediastinal silhouette is normal in size. There are no acute osseous abnormalities. There are mild degenerative changes of the AC joints.    IMPRESSION: No acute pulmonary process    Electronically signed by:  Suad Hayes MD  5/14/2022 10:39 AM CDT Workstation: [id number]                              12-lead EKG interpretation:  (if applicable)      Current Cardiac Rhythm:   (if applicable)    Physical Exam:   Physical Exam    ASSESSMENT/PLAN:   Assessment:   Chest Pain - serial troponins negative, new TWI in inferior and lateral leads, she was scheduled for outpatient ischemic workup.   Paroxysmal Atrial Fibrillation - tele reviewed, on amiodarone and xarelto.  Hypertension  Hypokalemia- K 3.1 today  Hypomagnesemia  Hyponatremia -resolved.     Plan:   Nuclear stress test ordered.  Echo pending.   Monitor for chest pain.   Monitor tele.   Continue amiodarone, xarelto, and BB. Patient is bradycardic on tele HR 45 bpm, will decrease metoprolol to 12.5 mg daily. Continue to monitor tele.   Recommend repletion of electrolytes.     Thank you for your consult, we will continue to follow the patient.

## 2022-05-15 NOTE — PLAN OF CARE
Problem: Adult Inpatient Plan of Care  Goal: Plan of Care Review  Outcome: Ongoing, Progressing  Goal: Patient-Specific Goal (Individualized)  Outcome: Ongoing, Progressing  Goal: Absence of Hospital-Acquired Illness or Injury  Outcome: Ongoing, Progressing     Problem: Fall Injury Risk  Goal: Absence of Fall and Fall-Related Injury  Outcome: Ongoing, Progressing

## 2022-05-16 ENCOUNTER — CLINICAL SUPPORT (OUTPATIENT)
Dept: CARDIOLOGY | Facility: HOSPITAL | Age: 67
End: 2022-05-16
Attending: INTERNAL MEDICINE
Payer: COMMERCIAL

## 2022-05-16 VITALS
DIASTOLIC BLOOD PRESSURE: 59 MMHG | TEMPERATURE: 98 F | SYSTOLIC BLOOD PRESSURE: 115 MMHG | HEART RATE: 61 BPM | HEIGHT: 63 IN | RESPIRATION RATE: 18 BRPM | OXYGEN SATURATION: 97 % | BODY MASS INDEX: 34.25 KG/M2 | WEIGHT: 193.31 LBS

## 2022-05-16 PROBLEM — E87.6 HYPOKALEMIA: Status: RESOLVED | Noted: 2022-05-15 | Resolved: 2022-05-16

## 2022-05-16 PROBLEM — R07.9 CHEST PAIN: Status: RESOLVED | Noted: 2022-05-14 | Resolved: 2022-05-16

## 2022-05-16 LAB
CV PHARM DOSE: 0.4 MG
CV STRESS BASE HR: 78 BPM
DIASTOLIC BLOOD PRESSURE: 60 MMHG
OHS CV CPX 1 MINUTE RECOVERY HEART RATE: 99 BPM
OHS CV CPX 85 PERCENT MAX PREDICTED HEART RATE MALE: 124
OHS CV CPX MAX PREDICTED HEART RATE: 146
OHS CV CPX PATIENT IS FEMALE: 1
OHS CV CPX PATIENT IS MALE: 0
OHS CV CPX PEAK DIASTOLIC BLOOD PRESSURE: 58 MMHG
OHS CV CPX PEAK HEAR RATE: 100 BPM
OHS CV CPX PEAK RATE PRESSURE PRODUCT: NORMAL
OHS CV CPX PEAK SYSTOLIC BLOOD PRESSURE: 111 MMHG
OHS CV CPX PERCENT MAX PREDICTED HEART RATE ACHIEVED: 68
OHS CV CPX RATE PRESSURE PRODUCT PRESENTING: NORMAL
SYSTOLIC BLOOD PRESSURE: 132 MMHG

## 2022-05-16 PROCEDURE — G0378 HOSPITAL OBSERVATION PER HR: HCPCS

## 2022-05-16 PROCEDURE — 93016 CV STRESS TEST SUPVJ ONLY: CPT | Mod: ,,, | Performed by: INTERNAL MEDICINE

## 2022-05-16 PROCEDURE — 93016 NUCLEAR STRESS TEST (CUPID ONLY): ICD-10-PCS | Mod: ,,, | Performed by: INTERNAL MEDICINE

## 2022-05-16 PROCEDURE — 93017 CV STRESS TEST TRACING ONLY: CPT

## 2022-05-16 PROCEDURE — 25000003 PHARM REV CODE 250: Performed by: INTERNAL MEDICINE

## 2022-05-16 PROCEDURE — 63600175 PHARM REV CODE 636 W HCPCS: Performed by: INTERNAL MEDICINE

## 2022-05-16 PROCEDURE — 93018 NUCLEAR STRESS TEST (CUPID ONLY): ICD-10-PCS | Mod: ,,, | Performed by: INTERNAL MEDICINE

## 2022-05-16 PROCEDURE — 93018 CV STRESS TEST I&R ONLY: CPT | Mod: ,,, | Performed by: INTERNAL MEDICINE

## 2022-05-16 RX ORDER — CITALOPRAM 20 MG/1
20 TABLET, FILM COATED ORAL DAILY
Qty: 30 TABLET | Refills: 0 | Status: SHIPPED | OUTPATIENT
Start: 2022-05-17 | End: 2023-06-28

## 2022-05-16 RX ORDER — REGADENOSON 0.08 MG/ML
0.4 INJECTION, SOLUTION INTRAVENOUS
Status: COMPLETED | OUTPATIENT
Start: 2022-05-16 | End: 2022-05-16

## 2022-05-16 RX ADMIN — ALPRAZOLAM 0.5 MG: 0.5 TABLET ORAL at 01:05

## 2022-05-16 RX ADMIN — REGADENOSON 0.4 MG: 0.08 INJECTION, SOLUTION INTRAVENOUS at 09:05

## 2022-05-16 RX ADMIN — CHLORTHALIDONE 25 MG: 25 TABLET ORAL at 10:05

## 2022-05-16 RX ADMIN — CITALOPRAM HYDROBROMIDE 20 MG: 20 TABLET ORAL at 10:05

## 2022-05-16 RX ADMIN — LISINOPRIL 10 MG: 10 TABLET ORAL at 10:05

## 2022-05-16 NOTE — PLAN OF CARE
[facility]  Initial Discharge Assessment       Primary Care Provider: Ortiz Jarrell MD    Admission Diagnosis: Chest pain, unspecified [R07.9]    Admission Date: 5/14/2022  Expected Discharge Date: 5/16/2022    Discharge Barriers Identified: (P) None    Payor: [employer] / Plan: [employer] MEDICARE HMO / Product Type: Medicare Advantage /     Extended Emergency Contact Information  Primary Emergency Contact: Marsha Narayan   [place]  Mobile Phone: [phone]  Relation: Daughter  Secondary Emergency Contact: Estella Malin  Mobile Phone: [phone]  Relation: Friend  Preferred language: English   needed? No    Discharge Plan A: (P) Home  Discharge Plan B: (P) Home      [id number] - [place], [place] - [address]  [address] [place] [address]  Phone: [phone] Fax: [phone]      Initial Assessment (most recent)       Adult Discharge Assessment - 05/16/22 1522          Discharge Assessment    Assessment Type Discharge Planning Assessment (P)      Confirmed/corrected address, phone number and insurance Yes (P)      Confirmed Demographics Correct on Facesheet (P)      Source of Information patient (P)    Assessment completed at bedside    Communicated DUNIA with patient/caregiver Yes (P)      Lives With alone (P)      Do you expect to return to your current living situation? Yes (P)      Prior to hospitilization cognitive status: Alert/Oriented (P)      Current cognitive status: Alert/Oriented (P)      Walking or Climbing Stairs Difficulty none (P)      Dressing/Bathing Difficulty none (P)      Equipment Currently Used at Home none (P)    Has walker from prior knee surgery but no longer uses    Readmission within 30 days? No (P)      Patient currently being followed by outpatient case management? No (P)      Do you currently have service(s) that help you manage your care at home? No (P)      Do you take prescription medications? Yes (P)      Do you have  prescription coverage? Yes (P)      Coverage [employer] (P)      Do you have any problems affording any of your prescribed medications? No (P)      Is the patient taking medications as prescribed? yes (P)      Who is going to help you get home at discharge? Cousin (P)      How do you get to doctors appointments? car, drives self (P)      Are you on dialysis? No (P)      Do you take coumadin? No (P)      Discharge Plan A Home (P)      Discharge Plan B Home (P)      DME Needed Upon Discharge  none (P)      Discharge Barriers Identified None (P)

## 2022-05-16 NOTE — PLAN OF CARE
Problem: Adult Inpatient Plan of Care  Goal: Plan of Care Review  Outcome: Met  Goal: Patient-Specific Goal (Individualized)  Outcome: Met  Goal: Absence of Hospital-Acquired Illness or Injury  Outcome: Met  Goal: Optimal Comfort and Wellbeing  Outcome: Met  Goal: Readiness for Transition of Care  Outcome: Met     Problem: Fall Injury Risk  Goal: Absence of Fall and Fall-Related Injury  Outcome: Met

## 2022-05-16 NOTE — PLAN OF CARE
Patient declines follow up appointment at this time as she states she is having car trouble and wishes to make appointment when she can assure she has reliable transportation.     Discharge orders and chart reviewed with no further post-acute discharge needs identified at this time.  At this time, patient is cleared for discharge from Case Management standpoint.        05/16/22 1524   Final Note   Assessment Type Final Discharge Note   Anticipated Discharge Disposition Home   Hospital Resources/Appts/Education Provided Patient refused appointment set-up   Post-Acute Status   Discharge Delays None known at this time        05/16/22 1524   GHOTRA Message   Medicare Outpatient and Observation Notification regarding financial responsibility Given to patient/caregiver;Explained to patient/caregiver;Signed/date by patient/caregiver   Date GHOTRA was signed 05/16/22   Time GHOTRA was signed 1525

## 2022-05-16 NOTE — PROGRESS NOTES
"[facility] Medicine  Progress Note    Patient Name: Paula Elmore  MRN: [id number]  Patient Class: OP- Observation   Admission Date: 5/14/2022  Length of Stay: 0 days  Attending Physician: Alfredo Caicedo MD  Primary Care Provider: Ortiz Jarrell MD        Subjective:     Principal Problem:Chest pain        HPI:  66 year old female with history of PAF, PVC, HTN, and has had 1/3 COVID vaccinations presented to ED complaining of central chest aching (6/10) for the past 7-10 days comes and goes of own accord, is associated with SOB, and fatigue; "Sometimes goes to my left shoulder". Is scheduled for stress and ECHO in 1 month. Became worried and called EMS last PM, but decided not to come to hospital. This AM when occurred again she did come.     Also, the patient is not sleeping at night time, is fatigued "All the time", lives alone in [place]--daughter lives in [place]--has a friend, who lives in [place]; she becomes anxious and cries easily. She denied suicidal and/or homicidal ideation. No thought disorder, nor paranoid thinking. Has not been on any psychiatric medications. Feels "Sad and depressed".    In ED: labs reviewed: normal CBC; trivial hyponatremia, hypokalemia and hypomagnesemia (both treated in ED--and sliding scale started) with normal renal function; Cardiac biomarkers are normal. Flu and COVID are negative. CXR: NAPD. EKG: sinus nathalie with inferolateral T wave flattening or inversion, but no acute segments.       Overview/Hospital Course:  05/15  Final Nuclear stress test will be completed only Tomorrow ?!  Anxious and under stress      Interval History:     Review of Systems   Constitutional:  Negative for activity change and appetite change.   HENT:  Negative for congestion and dental problem.    Eyes:  Negative for discharge and itching.   Respiratory:  Negative for shortness of breath.    Cardiovascular:  Negative for chest pain.   Gastrointestinal:  Negative for abdominal " distention and abdominal pain.   Endocrine: Negative for cold intolerance.   Genitourinary:  Negative for difficulty urinating and dysuria.   Musculoskeletal:  Negative for arthralgias and back pain.   Skin:  Negative for color change.   Neurological:  Negative for dizziness and facial asymmetry.   Hematological:  Negative for adenopathy.   Psychiatric/Behavioral:  Negative for agitation and behavioral problems.    Objective:     Vital Signs (Most Recent):  Temp: 98.6 °F (37 °C) (05/15/22 1852)  Pulse: (!) 51 (05/15/22 1852)  Resp: 17 (05/15/22 1852)  BP: 106/60 (05/15/22 1852)  SpO2: 95 % (05/15/22 1852)   Vital Signs (24h Range):  Temp:  [97.8 °F (36.6 °C)-98.8 °F (37.1 °C)] 98.6 °F (37 °C)  Pulse:  [50-73] 51  Resp:  [16-18] 17  SpO2:  [95 %-100 %] 95 %  BP: ()/(51-60) 106/60     Weight: 87.5 kg (192 lb 12.8 oz)  Body mass index is 34.15 kg/m².    Intake/Output Summary (Last 24 hours) at 5/15/2022 1945  Last data filed at 5/15/2022 1700  Gross per 24 hour   Intake 500 ml   Output 700 ml   Net -200 ml      Physical Exam  Vitals and nursing note reviewed.   Constitutional:       General: She is not in acute distress.  HENT:      Head: Atraumatic.      Right Ear: External ear normal.      Left Ear: External ear normal.      Nose: Nose normal.      Mouth/Throat:      Mouth: Mucous membranes are moist.   Eyes:      General: No scleral icterus.  Cardiovascular:      Rate and Rhythm: Normal rate.   Pulmonary:      Effort: Pulmonary effort is normal.   Abdominal:      General: There is no distension.   Musculoskeletal:         General: Normal range of motion.      Cervical back: Normal range of motion.   Skin:     General: Skin is warm.   Neurological:      Mental Status: She is alert and oriented to person, place, and time.   Psychiatric:         Behavior: Behavior normal.       Significant Labs: All pertinent labs within the past 24 hours have been reviewed.  CBC:   Recent Labs   Lab 05/14/22  1015   WBC 9.24    HGB 13.1   HCT 39.9        CMP:   Recent Labs   Lab 05/14/22  1015 05/15/22  0509   * 136   K 3.1* 3.2*  3.1*   CL 98 98   CO2 24 28    100   BUN 21 14   CREATININE 0.7 0.6   CALCIUM 8.8 8.5*   PROT 6.7  --    ALBUMIN 3.4*  --    BILITOT 0.5  --    ALKPHOS 53*  --    AST 15  --    ALT 12  --    ANIONGAP 12 10   EGFRNONAA >60.0 >60.0       Significant Imaging: I have reviewed all pertinent imaging results/findings within the past 24 hours.      Assessment/Plan:      * Chest pain  If stress test is normal home tomorrow AM  Mostly atypical       Hypokalemia  POA  Monitor and replete      Dysthymia  Started on celexa  Also very anxious about everything  PRN basis xanax      VTE Risk Mitigation (From admission, onward)         Ordered     rivaroxaban tablet 20 mg  With dinner         05/14/22 1442     IP VTE HIGH RISK PATIENT  Once         05/14/22 1442     Place sequential compression device  Until discontinued         05/14/22 1442                Discharge Planning   DUNIA: 5/16/2022     Code Status: Full Code   Is the patient medically ready for discharge?: No    Reason for patient still in hospital (select all that apply): Treatment                     Alfredo Caicedo MD  Department of Hospital Medicine   [facility]

## 2022-05-16 NOTE — PLAN OF CARE
Problem: Adult Inpatient Plan of Care  Goal: Plan of Care Review  Outcome: Ongoing, Progressing  Goal: Patient-Specific Goal (Individualized)  Outcome: Ongoing, Progressing     Problem: Fall Injury Risk  Goal: Absence of Fall and Fall-Related Injury  Outcome: Ongoing, Progressing

## 2022-05-16 NOTE — NURSING
Discharge instructions reviewed with pt. Questions answered. Copy of discharge instructions given to pt. Discharge home via w/c to vehicle. Accompanied by friend.

## 2022-05-16 NOTE — NURSING
Attempt made by both extender and this nurse to obtain a standing scale weight on the pt. Pt refused to get up at this time. Bed scale obtained. Will pass on to day shift to attempt to obtain standing scale weight on pt this a.m. when pt is more alert and cooperative.

## 2022-05-16 NOTE — SUBJECTIVE & OBJECTIVE
Interval History:     Review of Systems   Constitutional:  Negative for activity change and appetite change.   HENT:  Negative for congestion and dental problem.    Eyes:  Negative for discharge and itching.   Respiratory:  Negative for shortness of breath.    Cardiovascular:  Negative for chest pain.   Gastrointestinal:  Negative for abdominal distention and abdominal pain.   Endocrine: Negative for cold intolerance.   Genitourinary:  Negative for difficulty urinating and dysuria.   Musculoskeletal:  Negative for arthralgias and back pain.   Skin:  Negative for color change.   Neurological:  Negative for dizziness and facial asymmetry.   Hematological:  Negative for adenopathy.   Psychiatric/Behavioral:  Negative for agitation and behavioral problems.    Objective:     Vital Signs (Most Recent):  Temp: 98.6 °F (37 °C) (05/15/22 1852)  Pulse: (!) 51 (05/15/22 1852)  Resp: 17 (05/15/22 1852)  BP: 106/60 (05/15/22 1852)  SpO2: 95 % (05/15/22 1852)   Vital Signs (24h Range):  Temp:  [97.8 °F (36.6 °C)-98.8 °F (37.1 °C)] 98.6 °F (37 °C)  Pulse:  [50-73] 51  Resp:  [16-18] 17  SpO2:  [95 %-100 %] 95 %  BP: ()/(51-60) 106/60     Weight: 87.5 kg (192 lb 12.8 oz)  Body mass index is 34.15 kg/m².    Intake/Output Summary (Last 24 hours) at 5/15/2022 1945  Last data filed at 5/15/2022 1700  Gross per 24 hour   Intake 500 ml   Output 700 ml   Net -200 ml      Physical Exam  Vitals and nursing note reviewed.   Constitutional:       General: She is not in acute distress.  HENT:      Head: Atraumatic.      Right Ear: External ear normal.      Left Ear: External ear normal.      Nose: Nose normal.      Mouth/Throat:      Mouth: Mucous membranes are moist.   Eyes:      General: No scleral icterus.  Cardiovascular:      Rate and Rhythm: Normal rate.   Pulmonary:      Effort: Pulmonary effort is normal.   Abdominal:      General: There is no distension.   Musculoskeletal:         General: Normal range of motion.      Cervical  back: Normal range of motion.   Skin:     General: Skin is warm.   Neurological:      Mental Status: She is alert and oriented to person, place, and time.   Psychiatric:         Behavior: Behavior normal.       Significant Labs: All pertinent labs within the past 24 hours have been reviewed.  CBC:   Recent Labs   Lab 05/14/22  1015   WBC 9.24   HGB 13.1   HCT 39.9        CMP:   Recent Labs   Lab 05/14/22  1015 05/15/22  0509   * 136   K 3.1* 3.2*  3.1*   CL 98 98   CO2 24 28    100   BUN 21 14   CREATININE 0.7 0.6   CALCIUM 8.8 8.5*   PROT 6.7  --    ALBUMIN 3.4*  --    BILITOT 0.5  --    ALKPHOS 53*  --    AST 15  --    ALT 12  --    ANIONGAP 12 10   EGFRNONAA >60.0 >60.0       Significant Imaging: I have reviewed all pertinent imaging results/findings within the past 24 hours.

## 2022-05-16 NOTE — HOSPITAL COURSE
Patient admitted with severe stress and anxiety  Upon admission pt was started on antidepressants by admitting MD  Stress test was done and pt was evaluated by her Cardiology team  Per stress test:small focal area of diminished tracer accumulation within the inferoseptal wall near the apex on stress as compared to rest imaging.   A small focal area of pharmacologically induced reversibility is not excluded.  Pt was discharged to home and will follow up with Cardio MD on outpatient basis

## 2022-05-16 NOTE — PROGRESS NOTES
@  09:13 PATIENT INJECTED WITH MYOVIEW IV FOR STRESS IMAGES.    LEXISCAN STRESS        RELEASE NPO STATUS FROM NUCLEAR MEDICINE.

## 2022-05-17 NOTE — DISCHARGE SUMMARY
"[facility] Medicine  Discharge Summary      Patient Name: Paula Elmore  MRN: [id number]  Patient Class: OP- Observation  Admission Date: 5/14/2022  Hospital Length of Stay: 0 days  Discharge Date and Time:  05/16/2022 9:15 PM  Attending Physician: No att. providers found   Discharging Provider: Alfredo Caicedo MD  Primary Care Provider: Ortiz Jarrell MD      HPI:   66 year old female with history of PAF, PVC, HTN, and has had 1/3 COVID vaccinations presented to ED complaining of central chest aching (6/10) for the past 7-10 days comes and goes of own accord, is associated with SOB, and fatigue; "Sometimes goes to my left shoulder". Is scheduled for stress and ECHO in 1 month. Became worried and called EMS last PM, but decided not to come to hospital. This AM when occurred again she did come.     Also, the patient is not sleeping at night time, is fatigued "All the time", lives alone in [place]--daughter lives in [place]--has a friend, who lives in [place]; she becomes anxious and cries easily. She denied suicidal and/or homicidal ideation. No thought disorder, nor paranoid thinking. Has not been on any psychiatric medications. Feels "Sad and depressed".    In ED: labs reviewed: normal CBC; trivial hyponatremia, hypokalemia and hypomagnesemia (both treated in ED--and sliding scale started) with normal renal function; Cardiac biomarkers are normal. Flu and COVID are negative. CXR: NAPD. EKG: sinus nathalie with inferolateral T wave flattening or inversion, but no acute segments.       * No surgery found *      Hospital Course:   Patient admitted with severe stress and anxiety  Upon admission pt was started on antidepressants by admitting MD  Stress test was done and pt was evaluated by her Cardiology team  Per stress test:small focal area of diminished tracer accumulation within the inferoseptal wall near the apex on stress as compared to rest imaging.   A small focal area of pharmacologically " induced reversibility is not excluded.  Pt was discharged to home and will follow up with Cardio MD on outpatient basis        Goals of Care Treatment Preferences:  Code Status: Full Code      Consults:   Consults (From admission, onward)        Status Ordering Provider     Inpatient consult to Cardiology  Once        Provider:  Grabiel Subramanian III, MD    Completed NIKOLAY HERNÁNDEZ          No new Assessment & Plan notes have been filed under this hospital service since the last note was generated.  Service: Hospital Medicine    Final Active Diagnoses:    Diagnosis Date Noted POA    Dysthymia [F34.1] 05/14/2022 Yes      Problems Resolved During this Admission:    Diagnosis Date Noted Date Resolved POA    PRINCIPAL PROBLEM:  Chest pain [R07.9] 05/14/2022 05/16/2022 No    Hypokalemia [E87.6] 05/15/2022 05/16/2022 Unknown       Discharged Condition: good    Disposition: Home or Self Care    Follow Up:   Follow-up Information     Ortiz Jarrell MD Follow up in 1 week(s).    Specialties: Cardiology, Interventional Cardiology  Contact information:  [address]  [address]  [facility] [id number] [phone]                       Patient Instructions:   No discharge procedures on file.    Significant Diagnostic Studies: Labs:   CMP   Recent Labs   Lab 05/15/22  0509      K 3.2*  3.1*   CL 98   CO2 28      BUN 14   CREATININE 0.6   CALCIUM 8.5*   ANIONGAP 10   ESTGFRAFRICA >60.0   EGFRNONAA >60.0    and CBC No results for input(s): WBC, HGB, HCT, PLT in the last 48 hours.    Pending Diagnostic Studies:     None         Medications:  Reconciled Home Medications:      Medication List      START taking these medications    citalopram 20 MG tablet  Commonly known as: CeleXA  Take 1 tablet (20 mg total) by mouth once daily.  Start taking on: May 17, 2022        CONTINUE taking these medications    amiodarone 200 MG Tab  Commonly known as: PACERONE  Take 200 mg by mouth 2 (two) times  daily.     amLODIPine 5 MG tablet  Commonly known as: NORVASC  Take 5 mg by mouth once daily.     chlorthalidone 25 MG Tab  Commonly known as: HYGROTEN  Take 25 mg by mouth once daily.     lisinopriL 10 MG tablet  Take 10 mg by mouth once daily.     rivaroxaban 20 mg Tab  Commonly known as: XARELTO  Take 1 tablet (20 mg total) by mouth daily with dinner or evening meal.        STOP taking these medications    flecainide 100 MG Tab  Commonly known as: TAMBOCOR     hydroCHLOROthiazide 25 MG tablet  Commonly known as: HYDRODIURIL     metoprolol succinate 25 MG 24 hr tablet  Commonly known as: TOPROL-XL     naproxen 375 MG tablet  Commonly known as: NAPROSYN            Indwelling Lines/Drains at time of discharge:   Lines/Drains/Airways     None               Physical Exam  Cardiovascular:      Rate and Rhythm: Normal rate.   Neurological:      Mental Status: She is alert and oriented to person, place, and time.       Time spent on the discharge of patient: 23  minutes         Alfredo Caicedo MD  Department of Hospital Medicine  [facility]   Detail Level: Detailed

## 2022-06-01 ENCOUNTER — HOSPITAL ENCOUNTER (OUTPATIENT)
Dept: PREADMISSION TESTING | Facility: HOSPITAL | Age: 67
Discharge: HOME | End: 2022-06-01
Attending: INTERNAL MEDICINE
Payer: COMMERCIAL

## 2022-06-01 DIAGNOSIS — Z01.810 PRE-PROCEDURAL CARDIOVASCULAR EXAMINATION: Primary | ICD-10-CM

## 2022-06-01 LAB
ALBUMIN SERPL BCP-MCNC: 4.1 G/DL (ref 3.5–5.2)
ALP SERPL-CCNC: 57 U/L (ref 55–135)
ALT SERPL W/O P-5'-P-CCNC: 18 U/L (ref 10–44)
ANION GAP SERPL CALC-SCNC: 11 MMOL/L (ref 8–16)
APTT PPP: 29.5 SEC (ref 23.3–35.1)
AST SERPL-CCNC: 21 U/L (ref 10–40)
BILIRUB SERPL-MCNC: 0.4 MG/DL (ref 0.1–1)
BUN SERPL-MCNC: 20 MG/DL (ref 8–23)
CALCIUM SERPL-MCNC: 9.5 MG/DL (ref 8.7–10.5)
CHLORIDE SERPL-SCNC: 94 MMOL/L (ref 95–110)
CO2 SERPL-SCNC: 27 MMOL/L (ref 23–29)
CREAT SERPL-MCNC: 0.8 MG/DL (ref 0.5–1.4)
ERYTHROCYTE [DISTWIDTH] IN BLOOD BY AUTOMATED COUNT: 14.6 % (ref 11.5–14.5)
EST. GFR  (AFRICAN AMERICAN): >60 ML/MIN/1.73 M^2
EST. GFR  (NON AFRICAN AMERICAN): >60 ML/MIN/1.73 M^2
GLUCOSE SERPL-MCNC: 146 MG/DL (ref 70–110)
HCT VFR BLD AUTO: 43.2 % (ref 37–48.5)
HGB BLD-MCNC: 14.1 G/DL (ref 12–16)
INR PPP: 1.1
MAGNESIUM SERPL-MCNC: 1.8 MG/DL (ref 1.6–2.6)
MCH RBC QN AUTO: 26.7 PG (ref 27–31)
MCHC RBC AUTO-ENTMCNC: 32.6 G/DL (ref 32–36)
MCV RBC AUTO: 82 FL (ref 82–98)
PLATELET # BLD AUTO: 460 K/UL (ref 150–450)
PMV BLD AUTO: 10.3 FL (ref 9.2–12.9)
POTASSIUM SERPL-SCNC: 3.4 MMOL/L (ref 3.5–5.1)
PROT SERPL-MCNC: 7.9 G/DL (ref 6–8.4)
PROTHROMBIN TIME: 13.5 SEC (ref 11.4–13.7)
RBC # BLD AUTO: 5.29 M/UL (ref 4–5.4)
SODIUM SERPL-SCNC: 132 MMOL/L (ref 136–145)
WBC # BLD AUTO: 7.65 K/UL (ref 3.9–12.7)

## 2022-06-01 PROCEDURE — 93005 ELECTROCARDIOGRAM TRACING: CPT | Performed by: INTERNAL MEDICINE

## 2022-06-01 PROCEDURE — 83735 ASSAY OF MAGNESIUM: CPT | Performed by: INTERNAL MEDICINE

## 2022-06-01 PROCEDURE — 85027 COMPLETE CBC AUTOMATED: CPT | Performed by: INTERNAL MEDICINE

## 2022-06-01 PROCEDURE — 93010 EKG 12-LEAD: ICD-10-PCS | Mod: ,,, | Performed by: INTERNAL MEDICINE

## 2022-06-01 PROCEDURE — 93010 ELECTROCARDIOGRAM REPORT: CPT | Mod: ,,, | Performed by: INTERNAL MEDICINE

## 2022-06-01 PROCEDURE — 36415 COLL VENOUS BLD VENIPUNCTURE: CPT | Performed by: INTERNAL MEDICINE

## 2022-06-01 PROCEDURE — 80053 COMPREHEN METABOLIC PANEL: CPT | Performed by: INTERNAL MEDICINE

## 2022-06-01 PROCEDURE — 85610 PROTHROMBIN TIME: CPT | Performed by: INTERNAL MEDICINE

## 2022-06-01 PROCEDURE — 85730 THROMBOPLASTIN TIME PARTIAL: CPT | Performed by: INTERNAL MEDICINE

## 2022-06-02 ENCOUNTER — HOSPITAL ENCOUNTER (OUTPATIENT)
Facility: HOSPITAL | Age: 67
Discharge: HOME | End: 2022-06-02
Attending: INTERNAL MEDICINE | Admitting: INTERNAL MEDICINE
Payer: COMMERCIAL

## 2022-06-02 VITALS
DIASTOLIC BLOOD PRESSURE: 53 MMHG | SYSTOLIC BLOOD PRESSURE: 109 MMHG | RESPIRATION RATE: 16 BRPM | HEART RATE: 49 BPM | OXYGEN SATURATION: 97 %

## 2022-06-02 DIAGNOSIS — R94.39 ABNORMAL STRESS TEST: ICD-10-CM

## 2022-06-02 LAB — CATH EF QUANTITATIVE: 60 %

## 2022-06-02 PROCEDURE — 63600175 PHARM REV CODE 636 W HCPCS: Performed by: INTERNAL MEDICINE

## 2022-06-02 PROCEDURE — C1887 CATHETER, GUIDING: HCPCS | Performed by: INTERNAL MEDICINE

## 2022-06-02 PROCEDURE — 25000003 PHARM REV CODE 250: Performed by: INTERNAL MEDICINE

## 2022-06-02 PROCEDURE — 99153 MOD SED SAME PHYS/QHP EA: CPT | Performed by: INTERNAL MEDICINE

## 2022-06-02 PROCEDURE — 99152 MOD SED SAME PHYS/QHP 5/>YRS: CPT | Performed by: INTERNAL MEDICINE

## 2022-06-02 PROCEDURE — 93458 L HRT ARTERY/VENTRICLE ANGIO: CPT | Performed by: INTERNAL MEDICINE

## 2022-06-02 PROCEDURE — C1769 GUIDE WIRE: HCPCS | Performed by: INTERNAL MEDICINE

## 2022-06-02 PROCEDURE — C1894 INTRO/SHEATH, NON-LASER: HCPCS | Performed by: INTERNAL MEDICINE

## 2022-06-02 RX ORDER — MIDAZOLAM HYDROCHLORIDE 1 MG/ML
INJECTION INTRAMUSCULAR; INTRAVENOUS
Status: DISCONTINUED | OUTPATIENT
Start: 2022-06-02 | End: 2022-06-02 | Stop reason: HOSPADM

## 2022-06-02 RX ORDER — POTASSIUM CHLORIDE 20 MEQ/1
40 TABLET, EXTENDED RELEASE ORAL ONCE
Status: COMPLETED | OUTPATIENT
Start: 2022-06-02 | End: 2022-06-02

## 2022-06-02 RX ORDER — VERAPAMIL HYDROCHLORIDE 2.5 MG/ML
INJECTION, SOLUTION INTRAVENOUS
Status: DISCONTINUED | OUTPATIENT
Start: 2022-06-02 | End: 2022-06-02 | Stop reason: HOSPADM

## 2022-06-02 RX ORDER — SODIUM CHLORIDE 9 MG/ML
INJECTION, SOLUTION INTRAVENOUS ONCE
Status: COMPLETED | OUTPATIENT
Start: 2022-06-02 | End: 2022-06-02

## 2022-06-02 RX ORDER — FENTANYL CITRATE 50 UG/ML
INJECTION, SOLUTION INTRAMUSCULAR; INTRAVENOUS
Status: DISCONTINUED | OUTPATIENT
Start: 2022-06-02 | End: 2022-06-02 | Stop reason: HOSPADM

## 2022-06-02 RX ORDER — NITROGLYCERIN 5 MG/ML
INJECTION, SOLUTION INTRAVENOUS
Status: DISCONTINUED | OUTPATIENT
Start: 2022-06-02 | End: 2022-06-02 | Stop reason: HOSPADM

## 2022-06-02 RX ORDER — ACETAMINOPHEN 325 MG/1
650 TABLET ORAL EVERY 4 HOURS PRN
Status: DISCONTINUED | OUTPATIENT
Start: 2022-06-02 | End: 2022-06-02 | Stop reason: HOSPADM

## 2022-06-02 RX ORDER — ONDANSETRON 4 MG/1
8 TABLET, ORALLY DISINTEGRATING ORAL EVERY 8 HOURS PRN
Status: DISCONTINUED | OUTPATIENT
Start: 2022-06-02 | End: 2022-06-02 | Stop reason: HOSPADM

## 2022-06-02 RX ORDER — SODIUM CHLORIDE 9 MG/ML
INJECTION, SOLUTION INTRAVENOUS CONTINUOUS
Status: DISCONTINUED | OUTPATIENT
Start: 2022-06-02 | End: 2022-06-02 | Stop reason: HOSPADM

## 2022-06-02 RX ORDER — LIDOCAINE HYDROCHLORIDE 10 MG/ML
INJECTION, SOLUTION EPIDURAL; INFILTRATION; INTRACAUDAL; PERINEURAL
Status: DISCONTINUED | OUTPATIENT
Start: 2022-06-02 | End: 2022-06-02 | Stop reason: HOSPADM

## 2022-06-02 RX ORDER — HEPARIN SODIUM 1000 [USP'U]/ML
INJECTION, SOLUTION INTRAVENOUS; SUBCUTANEOUS
Status: DISCONTINUED | OUTPATIENT
Start: 2022-06-02 | End: 2022-06-02 | Stop reason: HOSPADM

## 2022-06-02 RX ADMIN — SODIUM CHLORIDE: 0.9 INJECTION, SOLUTION INTRAVENOUS at 06:06

## 2022-06-02 RX ADMIN — POTASSIUM CHLORIDE 40 MEQ: 1500 TABLET, EXTENDED RELEASE ORAL at 06:06

## 2022-06-02 NOTE — Clinical Note
The catheter was inserted into the left ventricle. Hemodynamics were performed.  and Pullback was recorded.  lv gram done.

## 2022-06-02 NOTE — Clinical Note
The catheter was repositioned into the ostium   left main. An angiography was performed of the left coronary arteries. Multiple views were taken. The angiography was performed via power injection.

## 2022-06-02 NOTE — DISCHARGE SUMMARY
[facility]  Cardiology  Discharge Summary      Patient Name: Paula Elmore  MRN: [id number]  Admission Date: 6/2/2022  Hospital Length of Stay: 0 days  Discharge Date and Time:  06/02/2022 8:23 AM  Attending Physician: Ortiz Jarrell MD  Discharging Provider: Ortiz Jarrell MD  Primary Care Physician: Ortiz Jarrell MD    HPI:  66-year-old female with risk factors for CAD who was recently seen in the hospital complaining of atypical chest pain.  She underwent a nuclear stress test showed a small apical defect.  In clinic she had new T-wave inversions noted in the inferolateral leads.  Based on the EKG, stress test and symptoms with her risk factors we elected to proceed with outpatient diagnostic left heart catheterization and possible percutaneous intervention of the coronaries.          Procedure(s) (LRB):  CATHETERIZATION, HEART, LEFT (Left)  ANGIOGRAM, CORONARY ARTERY (N/A)  Ventriculogram, Left     Indwelling Lines/Drains at time of discharge: none  Lines/Drains/Airways     None                 Hospital Course (synopsis of major diagnoses, care, treatment, and services provided during the course of the hospital stay):  Patient underwent diagnostic left heart catheterization without complications.  She had large clean epicardial coronary arteries with a codominant system.  She had normal left ventricular systolic and diastolic function EF 60% with an LVEDP of 3 mmHg.  She tolerated procedure well with no complications.  She was stable and ready for discharge to home after 2 [facility].    Consults: none    Significant Diagnostic Studies: Angiography: see op note    Pending Diagnostic Studies:     None          Final Active Diagnoses:    Diagnosis Date Noted POA    Abnormal stress test [R94.39] 06/02/2022 Unknown      Problems Resolved During this Admission:       Discharged Condition: good    Follow Up: 1 week   Follow-up Information     Ortiz Jarrell MD Follow up.     Specialties: Cardiology, Interventional Cardiology  Contact information:  [address]  [address]  [facility] [address]  [phone]                       Patient Instructions:      Diet Cardiac     Call MD for:  severe uncontrolled pain     Call MD for:  redness, tenderness, or signs of infection (pain, swelling, redness, odor or green/yellow discharge around incision site)     Remove dressing in 24 hours     Activity as tolerated     Medications:  Reconciled Home Medications:      Medication List      CONTINUE taking these medications    amiodarone 200 MG Tab  Commonly known as: PACERONE  Take 200 mg by mouth 2 (two) times daily.     amLODIPine 5 MG tablet  Commonly known as: NORVASC  Take 5 mg by mouth once daily.     chlorthalidone 25 MG Tab  Commonly known as: HYGROTEN  Take 25 mg by mouth once daily.     citalopram 20 MG tablet  Commonly known as: CeleXA  Take 1 tablet (20 mg total) by mouth once daily.     lisinopriL 10 MG tablet  Take 10 mg by mouth once daily.     rivaroxaban 20 mg Tab  Commonly known as: XARELTO  Take 1 tablet (20 mg total) by mouth daily with dinner or evening meal.        ASK your doctor about these medications    flecainide 100 MG Tab  Commonly known as: TAMBOCOR  Take 1 tablet (100 mg total) by mouth every 12 (twelve) hours.     hydroCHLOROthiazide 25 MG tablet  Commonly known as: HYDRODIURIL  TAKE 1 TABLET BY MOUTH EVERY DAY     metoprolol succinate 25 MG 24 hr tablet  Commonly known as: TOPROL-XL  Take 1 tablet (25 mg total) by mouth once daily.            Time spent on the discharge of patient: <30 minutes    Ortiz Jarrell MD  Cardiology  [facility]

## 2022-06-09 DIAGNOSIS — Z12.31 BREAST CANCER SCREENING BY MAMMOGRAM: Primary | ICD-10-CM

## 2022-06-24 ENCOUNTER — HOSPITAL ENCOUNTER (OUTPATIENT)
Dept: RADIOLOGY | Facility: HOSPITAL | Age: 67
Discharge: HOME | End: 2022-06-24
Attending: FAMILY MEDICINE
Payer: COMMERCIAL

## 2022-06-24 VITALS — BODY MASS INDEX: 34.18 KG/M2 | HEIGHT: 63 IN | WEIGHT: 192.88 LBS

## 2022-06-24 DIAGNOSIS — Z12.31 BREAST CANCER SCREENING BY MAMMOGRAM: ICD-10-CM

## 2022-06-24 PROCEDURE — 77063 BREAST TOMOSYNTHESIS BI: CPT | Mod: TC,PO

## 2022-07-28 ENCOUNTER — HOSPITAL ENCOUNTER (OUTPATIENT)
Facility: HOSPITAL | Age: 67
Discharge: HOME | End: 2022-07-30
Attending: EMERGENCY MEDICINE | Admitting: INTERNAL MEDICINE
Payer: COMMERCIAL

## 2022-07-28 DIAGNOSIS — R00.1 BRADYCARDIA: ICD-10-CM

## 2022-07-28 DIAGNOSIS — R07.9 CHEST PAIN: ICD-10-CM

## 2022-07-28 DIAGNOSIS — E87.6 HYPOKALEMIA: Primary | ICD-10-CM

## 2022-07-28 DIAGNOSIS — R31.9 HEMATURIA, UNSPECIFIED TYPE: ICD-10-CM

## 2022-07-28 LAB
AMPHET+METHAMPHET UR QL: NEGATIVE
BACTERIA #/AREA URNS HPF: NEGATIVE /HPF
BARBITURATES UR QL SCN>200 NG/ML: NEGATIVE
BENZODIAZ UR QL SCN>200 NG/ML: NEGATIVE
BILIRUB UR QL STRIP: NEGATIVE
BZE UR QL SCN: NEGATIVE
CANNABINOIDS UR QL SCN: NEGATIVE
CLARITY UR: ABNORMAL
COLOR UR: ABNORMAL
CREAT UR-MCNC: 102 MG/DL (ref 15–325)
GLUCOSE UR QL STRIP: NEGATIVE
HGB UR QL STRIP: ABNORMAL
HYALINE CASTS #/AREA URNS LPF: 5 /LPF
KETONES UR QL STRIP: NEGATIVE
LEUKOCYTE ESTERASE UR QL STRIP: NEGATIVE
MICROSCOPIC COMMENT: ABNORMAL
NITRITE UR QL STRIP: NEGATIVE
OPIATES UR QL SCN: NEGATIVE
PCP UR QL SCN>25 NG/ML: NEGATIVE
PH UR STRIP: 6 [PH] (ref 5–8)
PROT UR QL STRIP: ABNORMAL
RBC #/AREA URNS HPF: >100 /HPF (ref 0–4)
SP GR UR STRIP: 1.02 (ref 1–1.03)
SQUAMOUS #/AREA URNS HPF: 2 /HPF
TOXICOLOGY INFORMATION: NORMAL
URN SPEC COLLECT METH UR: ABNORMAL
UROBILINOGEN UR STRIP-ACNC: NEGATIVE EU/DL
WBC #/AREA URNS HPF: 6 /HPF (ref 0–5)

## 2022-07-28 PROCEDURE — 99285 EMERGENCY DEPT VISIT HI MDM: CPT | Mod: 25

## 2022-07-28 PROCEDURE — 82550 ASSAY OF CK (CPK): CPT | Performed by: EMERGENCY MEDICINE

## 2022-07-28 PROCEDURE — 85730 THROMBOPLASTIN TIME PARTIAL: CPT | Performed by: EMERGENCY MEDICINE

## 2022-07-28 PROCEDURE — 85610 PROTHROMBIN TIME: CPT | Performed by: EMERGENCY MEDICINE

## 2022-07-28 PROCEDURE — 84484 ASSAY OF TROPONIN QUANT: CPT | Performed by: EMERGENCY MEDICINE

## 2022-07-28 PROCEDURE — 83690 ASSAY OF LIPASE: CPT | Performed by: EMERGENCY MEDICINE

## 2022-07-28 PROCEDURE — 83735 ASSAY OF MAGNESIUM: CPT | Performed by: EMERGENCY MEDICINE

## 2022-07-28 PROCEDURE — 80307 DRUG TEST PRSMV CHEM ANLYZR: CPT | Performed by: EMERGENCY MEDICINE

## 2022-07-28 PROCEDURE — 84443 ASSAY THYROID STIM HORMONE: CPT | Performed by: EMERGENCY MEDICINE

## 2022-07-28 PROCEDURE — 93010 ELECTROCARDIOGRAM REPORT: CPT | Mod: ,,, | Performed by: INTERNAL MEDICINE

## 2022-07-28 PROCEDURE — 80053 COMPREHEN METABOLIC PANEL: CPT | Performed by: EMERGENCY MEDICINE

## 2022-07-28 PROCEDURE — 85025 COMPLETE CBC W/AUTO DIFF WBC: CPT | Performed by: EMERGENCY MEDICINE

## 2022-07-28 PROCEDURE — 81001 URINALYSIS AUTO W/SCOPE: CPT | Performed by: EMERGENCY MEDICINE

## 2022-07-28 PROCEDURE — 83880 ASSAY OF NATRIURETIC PEPTIDE: CPT | Performed by: EMERGENCY MEDICINE

## 2022-07-28 PROCEDURE — 87086 URINE CULTURE/COLONY COUNT: CPT | Performed by: EMERGENCY MEDICINE

## 2022-07-28 PROCEDURE — 93010 EKG 12-LEAD: ICD-10-PCS | Mod: ,,, | Performed by: INTERNAL MEDICINE

## 2022-07-28 PROCEDURE — 93005 ELECTROCARDIOGRAM TRACING: CPT | Performed by: INTERNAL MEDICINE

## 2022-07-28 PROCEDURE — U0002 COVID-19 LAB TEST NON-CDC: HCPCS | Performed by: EMERGENCY MEDICINE

## 2022-07-29 PROBLEM — R31.9 HEMATURIA: Status: ACTIVE | Noted: 2022-07-29

## 2022-07-29 LAB
ALBUMIN SERPL BCP-MCNC: 3.8 G/DL (ref 3.5–5.2)
ALP SERPL-CCNC: 46 U/L (ref 55–135)
ALT SERPL W/O P-5'-P-CCNC: 15 U/L (ref 10–44)
ANION GAP SERPL CALC-SCNC: 7 MMOL/L (ref 8–16)
ANION GAP SERPL CALC-SCNC: 9 MMOL/L (ref 8–16)
APTT PPP: 28.1 SEC (ref 23.3–35.1)
AST SERPL-CCNC: 19 U/L (ref 10–40)
BASOPHILS # BLD AUTO: 0.06 K/UL (ref 0–0.2)
BASOPHILS NFR BLD: 1 % (ref 0–1.9)
BILIRUB SERPL-MCNC: 0.6 MG/DL (ref 0.1–1)
BNP SERPL-MCNC: 44 PG/ML (ref 0–99)
BUN SERPL-MCNC: 17 MG/DL (ref 8–23)
BUN SERPL-MCNC: 20 MG/DL (ref 8–23)
CALCIUM SERPL-MCNC: 8.8 MG/DL (ref 8.7–10.5)
CALCIUM SERPL-MCNC: 9.4 MG/DL (ref 8.7–10.5)
CHLORIDE SERPL-SCNC: 102 MMOL/L (ref 95–110)
CHLORIDE SERPL-SCNC: 97 MMOL/L (ref 95–110)
CK SERPL-CCNC: 41 U/L (ref 20–180)
CO2 SERPL-SCNC: 28 MMOL/L (ref 23–29)
CO2 SERPL-SCNC: 31 MMOL/L (ref 23–29)
CREAT SERPL-MCNC: 0.7 MG/DL (ref 0.5–1.4)
CREAT SERPL-MCNC: 0.9 MG/DL (ref 0.5–1.4)
DIFFERENTIAL METHOD: NORMAL
EOSINOPHIL # BLD AUTO: 0.2 K/UL (ref 0–0.5)
EOSINOPHIL NFR BLD: 2.7 % (ref 0–8)
ERYTHROCYTE [DISTWIDTH] IN BLOOD BY AUTOMATED COUNT: 14.5 % (ref 11.5–14.5)
EST. GFR  (AFRICAN AMERICAN): >60 ML/MIN/1.73 M^2
EST. GFR  (AFRICAN AMERICAN): >60 ML/MIN/1.73 M^2
EST. GFR  (NON AFRICAN AMERICAN): >60 ML/MIN/1.73 M^2
EST. GFR  (NON AFRICAN AMERICAN): >60 ML/MIN/1.73 M^2
GLUCOSE SERPL-MCNC: 86 MG/DL (ref 70–110)
GLUCOSE SERPL-MCNC: 94 MG/DL (ref 70–110)
HCT VFR BLD AUTO: 37.1 % (ref 37–48.5)
HGB BLD-MCNC: 12.2 G/DL (ref 12–16)
IMM GRANULOCYTES # BLD AUTO: 0.01 K/UL (ref 0–0.04)
IMM GRANULOCYTES NFR BLD AUTO: 0.2 % (ref 0–0.5)
INR PPP: 2.8
LIPASE SERPL-CCNC: 37 U/L (ref 4–60)
LYMPHOCYTES # BLD AUTO: 1.8 K/UL (ref 1–4.8)
LYMPHOCYTES NFR BLD: 29.5 % (ref 18–48)
MAGNESIUM SERPL-MCNC: 1.7 MG/DL (ref 1.6–2.6)
MCH RBC QN AUTO: 27.1 PG (ref 27–31)
MCHC RBC AUTO-ENTMCNC: 32.9 G/DL (ref 32–36)
MCV RBC AUTO: 82 FL (ref 82–98)
MONOCYTES # BLD AUTO: 0.6 K/UL (ref 0.3–1)
MONOCYTES NFR BLD: 9.9 % (ref 4–15)
NEUTROPHILS # BLD AUTO: 3.4 K/UL (ref 1.8–7.7)
NEUTROPHILS NFR BLD: 56.7 % (ref 38–73)
NRBC BLD-RTO: 0 /100 WBC
PLATELET # BLD AUTO: 225 K/UL (ref 150–450)
PMV BLD AUTO: 10.7 FL (ref 9.2–12.9)
POTASSIUM SERPL-SCNC: 2.8 MMOL/L (ref 3.5–5.1)
POTASSIUM SERPL-SCNC: 3.7 MMOL/L (ref 3.5–5.1)
PROT SERPL-MCNC: 7.2 G/DL (ref 6–8.4)
PROTHROMBIN TIME: 28.2 SEC (ref 11.4–13.7)
RBC # BLD AUTO: 4.51 M/UL (ref 4–5.4)
SARS-COV-2 RDRP RESP QL NAA+PROBE: NEGATIVE
SODIUM SERPL-SCNC: 134 MMOL/L (ref 136–145)
SODIUM SERPL-SCNC: 140 MMOL/L (ref 136–145)
TROPONIN I SERPL DL<=0.01 NG/ML-MCNC: <0.03 NG/ML
TSH SERPL DL<=0.005 MIU/L-ACNC: 5.52 UIU/ML (ref 0.34–5.6)
WBC # BLD AUTO: 5.94 K/UL (ref 3.9–12.7)

## 2022-07-29 PROCEDURE — G0378 HOSPITAL OBSERVATION PER HR: HCPCS

## 2022-07-29 PROCEDURE — 25000003 PHARM REV CODE 250: Performed by: EMERGENCY MEDICINE

## 2022-07-29 PROCEDURE — 80048 BASIC METABOLIC PNL TOTAL CA: CPT | Performed by: INTERNAL MEDICINE

## 2022-07-29 PROCEDURE — 25000003 PHARM REV CODE 250: Performed by: INTERNAL MEDICINE

## 2022-07-29 RX ORDER — NALOXONE HCL 0.4 MG/ML
0.02 VIAL (ML) INJECTION
Status: DISCONTINUED | OUTPATIENT
Start: 2022-07-29 | End: 2022-07-30 | Stop reason: HOSPADM

## 2022-07-29 RX ORDER — POTASSIUM CHLORIDE 20 MEQ/1
20 TABLET, EXTENDED RELEASE ORAL
Status: DISCONTINUED | OUTPATIENT
Start: 2022-07-29 | End: 2022-07-30 | Stop reason: HOSPADM

## 2022-07-29 RX ORDER — AMLODIPINE BESYLATE 5 MG/1
5 TABLET ORAL DAILY
Status: DISCONTINUED | OUTPATIENT
Start: 2022-07-29 | End: 2022-07-30 | Stop reason: HOSPADM

## 2022-07-29 RX ORDER — POTASSIUM CHLORIDE 20 MEQ/1
40 TABLET, EXTENDED RELEASE ORAL
Status: DISCONTINUED | OUTPATIENT
Start: 2022-07-29 | End: 2022-07-30 | Stop reason: HOSPADM

## 2022-07-29 RX ORDER — MAGNESIUM SULFATE HEPTAHYDRATE 40 MG/ML
2 INJECTION, SOLUTION INTRAVENOUS
Status: DISCONTINUED | OUTPATIENT
Start: 2022-07-29 | End: 2022-07-30 | Stop reason: HOSPADM

## 2022-07-29 RX ORDER — MAGNESIUM SULFATE 1 G/100ML
1 INJECTION INTRAVENOUS
Status: DISCONTINUED | OUTPATIENT
Start: 2022-07-29 | End: 2022-07-30 | Stop reason: HOSPADM

## 2022-07-29 RX ORDER — ONDANSETRON 2 MG/ML
4 INJECTION INTRAMUSCULAR; INTRAVENOUS EVERY 6 HOURS PRN
Status: DISCONTINUED | OUTPATIENT
Start: 2022-07-29 | End: 2022-07-30 | Stop reason: HOSPADM

## 2022-07-29 RX ORDER — CHLORTHALIDONE 25 MG/1
25 TABLET ORAL EVERY OTHER DAY
Status: DISCONTINUED | OUTPATIENT
Start: 2022-07-29 | End: 2022-07-30 | Stop reason: HOSPADM

## 2022-07-29 RX ORDER — LANOLIN ALCOHOL/MO/W.PET/CERES
400 CREAM (GRAM) TOPICAL ONCE
Status: COMPLETED | OUTPATIENT
Start: 2022-07-29 | End: 2022-07-29

## 2022-07-29 RX ORDER — IBUPROFEN 200 MG
24 TABLET ORAL
Status: DISCONTINUED | OUTPATIENT
Start: 2022-07-29 | End: 2022-07-30 | Stop reason: HOSPADM

## 2022-07-29 RX ORDER — MAGNESIUM SULFATE HEPTAHYDRATE 40 MG/ML
4 INJECTION, SOLUTION INTRAVENOUS
Status: DISCONTINUED | OUTPATIENT
Start: 2022-07-29 | End: 2022-07-30 | Stop reason: HOSPADM

## 2022-07-29 RX ORDER — ACETAMINOPHEN 325 MG/1
650 TABLET ORAL EVERY 4 HOURS PRN
Status: DISCONTINUED | OUTPATIENT
Start: 2022-07-29 | End: 2022-07-30 | Stop reason: HOSPADM

## 2022-07-29 RX ORDER — LISINOPRIL 10 MG/1
10 TABLET ORAL DAILY
Status: DISCONTINUED | OUTPATIENT
Start: 2022-07-29 | End: 2022-07-30 | Stop reason: HOSPADM

## 2022-07-29 RX ORDER — GLUCAGON 1 MG
1 KIT INJECTION
Status: DISCONTINUED | OUTPATIENT
Start: 2022-07-29 | End: 2022-07-30 | Stop reason: HOSPADM

## 2022-07-29 RX ORDER — SODIUM CHLORIDE 0.9 % (FLUSH) 0.9 %
10 SYRINGE (ML) INJECTION EVERY 12 HOURS PRN
Status: DISCONTINUED | OUTPATIENT
Start: 2022-07-29 | End: 2022-07-30 | Stop reason: HOSPADM

## 2022-07-29 RX ORDER — POLYETHYLENE GLYCOL 3350 17 G/17G
17 POWDER, FOR SOLUTION ORAL 2 TIMES DAILY PRN
Status: DISCONTINUED | OUTPATIENT
Start: 2022-07-29 | End: 2022-07-30 | Stop reason: HOSPADM

## 2022-07-29 RX ORDER — ENOXAPARIN SODIUM 100 MG/ML
1 INJECTION SUBCUTANEOUS
Status: DISCONTINUED | OUTPATIENT
Start: 2022-07-29 | End: 2022-07-30 | Stop reason: HOSPADM

## 2022-07-29 RX ORDER — LANOLIN ALCOHOL/MO/W.PET/CERES
800 CREAM (GRAM) TOPICAL
Status: DISCONTINUED | OUTPATIENT
Start: 2022-07-29 | End: 2022-07-30 | Stop reason: HOSPADM

## 2022-07-29 RX ORDER — IBUPROFEN 200 MG
16 TABLET ORAL
Status: DISCONTINUED | OUTPATIENT
Start: 2022-07-29 | End: 2022-07-30 | Stop reason: HOSPADM

## 2022-07-29 RX ORDER — TALC
6 POWDER (GRAM) TOPICAL NIGHTLY PRN
Status: DISCONTINUED | OUTPATIENT
Start: 2022-07-29 | End: 2022-07-30 | Stop reason: HOSPADM

## 2022-07-29 RX ADMIN — Medication 400 MG: at 05:07

## 2022-07-29 RX ADMIN — POTASSIUM BICARBONATE 20 MEQ: 391 TABLET, EFFERVESCENT ORAL at 05:07

## 2022-07-29 RX ADMIN — POLYETHYLENE GLYCOL 3350 17 G: 17 POWDER, FOR SOLUTION ORAL at 10:07

## 2022-07-29 RX ADMIN — POTASSIUM BICARBONATE 50 MEQ: 977.5 TABLET, EFFERVESCENT ORAL at 01:07

## 2022-07-29 NOTE — SUBJECTIVE & OBJECTIVE
Past Medical History:   Diagnosis Date    Afib 2021    Afib     Arthritis     Carpal tunnel syndrome     Cataracts, bilateral     Hypertension     Peripheral neuropathy     Seasonal allergies     SVT (supraventricular tachycardia) 2021       Past Surgical History:   Procedure Laterality Date    APPENDECTOMY      CATARACT EXTRACTION       SECTION, CLASSIC      CHOLECYSTECTOMY      CORONARY ANGIOGRAPHY N/A 2022    Procedure: ANGIOGRAM, CORONARY ARTERY;  Surgeon: Ortiz Jarrell MD;  Location: [facility] CATH/EP LAB;  Service: Cardiology;  Laterality: N/A;    LEFT HEART CATHETERIZATION Left 2022    Procedure: CATHETERIZATION, HEART, LEFT;  Surgeon: Ortzi Jarrell MD;  Location: [facility] CATH/EP LAB;  Service: Cardiology;  Laterality: Left;       Review of patient's allergies indicates:   Allergen Reactions    Celebrex [celecoxib] Other (See Comments)     Headaches       No current facility-administered medications on file prior to encounter.     Current Outpatient Medications on File Prior to Encounter   Medication Sig    amiodarone (PACERONE) 200 MG Tab Take 200 mg by mouth 2 (two) times daily.    amLODIPine (NORVASC) 5 MG tablet Take 5 mg by mouth once daily.    chlorthalidone (HYGROTEN) 25 MG Tab Take 25 mg by mouth once daily.    lisinopriL 10 MG tablet Take 10 mg by mouth once daily.    rivaroxaban (XARELTO) 20 mg Tab Take 1 tablet (20 mg total) by mouth daily with dinner or evening meal.    citalopram (CELEXA) 20 MG tablet Take 1 tablet (20 mg total) by mouth once daily.    [DISCONTINUED] flecainide (TAMBOCOR) 100 MG Tab Take 1 tablet (100 mg total) by mouth every 12 (twelve) hours. (Patient not taking: No sig reported)    [DISCONTINUED] hydroCHLOROthiazide (HYDRODIURIL) 25 MG tablet TAKE 1 TABLET BY MOUTH EVERY DAY    [DISCONTINUED] metoprolol succinate (TOPROL-XL) 25 MG 24 hr tablet Take 1 tablet (25 mg total) by mouth once daily.     Family History       Problem Relation (Age of Onset)     Cancer Mother    Heart disease Mother, Father          Tobacco Use    Smoking status: Never Smoker    Smokeless tobacco: Never Used   Substance and Sexual Activity    Alcohol use: No    Drug use: No    Sexual activity: Not on file     Review of Systems   Constitutional: Negative.    HENT: Negative.     Eyes: Negative.    Respiratory: Negative.     Cardiovascular: Negative.         Bradycardia   Gastrointestinal: Negative.    Endocrine: Negative.    Genitourinary:  Positive for hematuria.        Groin pain   Musculoskeletal: Negative.    Skin: Negative.    Allergic/Immunologic: Negative.    Neurological: Negative.    Hematological: Negative.    Psychiatric/Behavioral: Negative.     Objective:     Vital Signs (Most Recent):  Temp: 98.2 °F (36.8 °C) (07/28/22 2231)  Pulse: (!) 42 (07/29/22 0058)  Resp: 16 (07/29/22 0058)  BP: (!) 149/79 (07/28/22 2231)  SpO2: 99 % (07/29/22 0058)   Vital Signs (24h Range):  Temp:  [98.2 °F (36.8 °C)] 98.2 °F (36.8 °C)  Pulse:  [42-56] 42  Resp:  [16] 16  SpO2:  [98 %-99 %] 99 %  BP: (149)/(79) 149/79     Weight: 87.5 kg (192 lb 14.4 oz)  Body mass index is 34.17 kg/m².    Physical Exam  Vitals and nursing note reviewed.   Constitutional:       General: She is not in acute distress.     Appearance: She is well-developed.   HENT:      Head: Normocephalic and atraumatic.   Eyes:      Pupils: Pupils are equal, round, and reactive to light.   Cardiovascular:      Rate and Rhythm: Regular rhythm.      Heart sounds: No murmur heard.  Pulmonary:      Effort: Pulmonary effort is normal.      Breath sounds: Normal breath sounds. No wheezing.   Abdominal:      General: There is no distension.      Palpations: Abdomen is soft.      Tenderness: There is no abdominal tenderness. There is no guarding or rebound.   Musculoskeletal:         General: Normal range of motion.      Cervical back: Normal range of motion.   Skin:     Findings: No rash.   Neurological:      Mental Status: She is alert and  oriented to person, place, and time.      Cranial Nerves: No cranial nerve deficit.      Sensory: No sensory deficit.         CRANIAL NERVES     CN III, IV, VI   Pupils are equal, round, and reactive to light.     Significant Labs: All pertinent labs within the past 24 hours have been reviewed.  CBC:   Recent Labs   Lab 07/28/22  2342   WBC 5.94   HGB 12.2   HCT 37.1        CMP:   Recent Labs   Lab 07/28/22 2342   *   K 2.8*   CL 97   CO2 28   GLU 94   BUN 20   CREATININE 0.9   CALCIUM 8.8   PROT 7.2   ALBUMIN 3.8   BILITOT 0.6   ALKPHOS 46*   AST 19   ALT 15   ANIONGAP 9   EGFRNONAA >60.0     Cardiac Markers:   Recent Labs   Lab 07/28/22  2342   BNP 44     Coagulation:   Recent Labs   Lab 07/28/22  2342   INR 2.8   APTT 28.1     Troponin:   Recent Labs   Lab 07/28/22 2342   TROPONINI <0.030     Urine Studies:   Recent Labs   Lab 07/28/22 2258   COLORU BROWN   APPEARANCEUA Hazy*   PHUR 6.0   SPECGRAV 1.020   PROTEINUA 1+*   GLUCUA Negative   KETONESU Negative   BILIRUBINUA Negative   OCCULTUA 3+*   NITRITE Negative   UROBILINOGEN Negative   LEUKOCYTESUR Negative   RBCUA >100*   WBCUA 6*   BACTERIA Negative   SQUAMEPITHEL 2   HYALINECASTS 5*       Significant Imaging: I have reviewed all pertinent imaging results/findings within the past 24 hours.  EKG: I have reviewed all pertinent results/findings within the past 24 hours and my personal findings are: sinus bradycardia with FTW in 1, AVL, V4-V6

## 2022-07-29 NOTE — ED NOTES
Report has been given. All questions and concerns have been addressed at this time. Tele box on patient. Patient in route to assigned room.

## 2022-07-29 NOTE — H&P
[facility] - Emergency Dept  Hospital Medicine  History & Physical    Patient Name: Paula Elmore  MRN: [id number]  Patient Class: OP- Observation  Admission Date: 7/28/2022  Attending Physician: Bishnu Beasley MD  Primary Care Provider: Ortiz Jarrell MD         Patient information was obtained from patient and ER records.     Subjective:     Principal Problem:Bradycardia    Chief Complaint:   Chief Complaint   Patient presents with    Bradycardia     Pt presents to ED with c/o low heart rate at home, states that her HR at home was in the 40's. Denies any SOB or dizziness with low HR.         HPI: 66 year old female with PMHx Parox afib on Amiodarone and Xarelto presents with bradycardia that she noted on her home blood pressure machine.  She reports she has been preparing to move and has been boxing and lifting these boxes and more active than general as a result.  She denies chest pain, BORGES, SOB or lightheadedness during this increased activity.  While checking her blood pressure at home today, she did note HR in the 40s and wound sometimes dip down into the 30s and this concerned her and thus she came to the ED.  She never had any documentation of hypotension.  Per my chart review, she had sinus bradycardia into the 40s and 50s back in February 2022 after she converted from afib to sinus.  Per Dr. Jarrell's note, she may have SSS.  Patient with [facility] 6/2022 with no significant disease.     In addition to the above, patient noted mild gross hematuria that started yesterday. She did feel some ache in her groin area that she attributed to lifting boxes.  She denies dysuria or frequency.      Past Medical History:   Diagnosis Date    Afib 09/28/2021    Afib     Arthritis     Carpal tunnel syndrome     Cataracts, bilateral     Hypertension     Peripheral neuropathy     Seasonal allergies     SVT (supraventricular tachycardia) 09/28/2021       Past Surgical History:   Procedure Laterality Date     APPENDECTOMY      CATARACT EXTRACTION       SECTION, CLASSIC      CHOLECYSTECTOMY      CORONARY ANGIOGRAPHY N/A 2022    Procedure: ANGIOGRAM, CORONARY ARTERY;  Surgeon: Ortiz Jarrell MD;  Location: [facility] CATH/EP LAB;  Service: Cardiology;  Laterality: N/A;    LEFT HEART CATHETERIZATION Left 2022    Procedure: CATHETERIZATION, HEART, LEFT;  Surgeon: Ortiz Jarrell MD;  Location: [facility] CATH/EP LAB;  Service: Cardiology;  Laterality: Left;       Review of patient's allergies indicates:   Allergen Reactions    Celebrex [celecoxib] Other (See Comments)     Headaches       No current facility-administered medications on file prior to encounter.     Current Outpatient Medications on File Prior to Encounter   Medication Sig    amiodarone (PACERONE) 200 MG Tab Take 200 mg by mouth 2 (two) times daily.    amLODIPine (NORVASC) 5 MG tablet Take 5 mg by mouth once daily.    chlorthalidone (HYGROTEN) 25 MG Tab Take 25 mg by mouth once daily.    lisinopriL 10 MG tablet Take 10 mg by mouth once daily.    rivaroxaban (XARELTO) 20 mg Tab Take 1 tablet (20 mg total) by mouth daily with dinner or evening meal.    citalopram (CELEXA) 20 MG tablet Take 1 tablet (20 mg total) by mouth once daily.    [DISCONTINUED] flecainide (TAMBOCOR) 100 MG Tab Take 1 tablet (100 mg total) by mouth every 12 (twelve) hours. (Patient not taking: No sig reported)    [DISCONTINUED] hydroCHLOROthiazide (HYDRODIURIL) 25 MG tablet TAKE 1 TABLET BY MOUTH EVERY DAY    [DISCONTINUED] metoprolol succinate (TOPROL-XL) 25 MG 24 hr tablet Take 1 tablet (25 mg total) by mouth once daily.     Family History       Problem Relation (Age of Onset)    Cancer Mother    Heart disease Mother, Father          Tobacco Use    Smoking status: Never Smoker    Smokeless tobacco: Never Used   Substance and Sexual Activity    Alcohol use: No    Drug use: No    Sexual activity: Not on file     Review of Systems   Constitutional: Negative.     HENT: Negative.     Eyes: Negative.    Respiratory: Negative.     Cardiovascular: Negative.         Bradycardia   Gastrointestinal: Negative.    Endocrine: Negative.    Genitourinary:  Positive for hematuria.        Groin pain   Musculoskeletal: Negative.    Skin: Negative.    Allergic/Immunologic: Negative.    Neurological: Negative.    Hematological: Negative.    Psychiatric/Behavioral: Negative.     Objective:     Vital Signs (Most Recent):  Temp: 98.2 °F (36.8 °C) (07/28/22 2231)  Pulse: (!) 42 (07/29/22 0058)  Resp: 16 (07/29/22 0058)  BP: (!) 149/79 (07/28/22 2231)  SpO2: 99 % (07/29/22 0058)   Vital Signs (24h Range):  Temp:  [98.2 °F (36.8 °C)] 98.2 °F (36.8 °C)  Pulse:  [42-56] 42  Resp:  [16] 16  SpO2:  [98 %-99 %] 99 %  BP: (149)/(79) 149/79     Weight: 87.5 kg (192 lb 14.4 oz)  Body mass index is 34.17 kg/m².    Physical Exam  Vitals and nursing note reviewed.   Constitutional:       General: She is not in acute distress.     Appearance: She is well-developed.   HENT:      Head: Normocephalic and atraumatic.   Eyes:      Pupils: Pupils are equal, round, and reactive to light.   Cardiovascular:      Rate and Rhythm: Regular rhythm.      Heart sounds: No murmur heard.  Pulmonary:      Effort: Pulmonary effort is normal.      Breath sounds: Normal breath sounds. No wheezing.   Abdominal:      General: There is no distension.      Palpations: Abdomen is soft.      Tenderness: There is no abdominal tenderness. There is no guarding or rebound.   Musculoskeletal:         General: Normal range of motion.      Cervical back: Normal range of motion.   Skin:     Findings: No rash.   Neurological:      Mental Status: She is alert and oriented to person, place, and time.      Cranial Nerves: No cranial nerve deficit.      Sensory: No sensory deficit.         CRANIAL NERVES     CN III, IV, VI   Pupils are equal, round, and reactive to light.     Significant Labs: All pertinent labs within the past 24 hours have been  reviewed.  CBC:   Recent Labs   Lab 07/28/22 2342   WBC 5.94   HGB 12.2   HCT 37.1        CMP:   Recent Labs   Lab 07/28/22 2342   *   K 2.8*   CL 97   CO2 28   GLU 94   BUN 20   CREATININE 0.9   CALCIUM 8.8   PROT 7.2   ALBUMIN 3.8   BILITOT 0.6   ALKPHOS 46*   AST 19   ALT 15   ANIONGAP 9   EGFRNONAA >60.0     Cardiac Markers:   Recent Labs   Lab 07/28/22  2342   BNP 44     Coagulation:   Recent Labs   Lab 07/28/22 2342   INR 2.8   APTT 28.1     Troponin:   Recent Labs   Lab 07/28/22 2342   TROPONINI <0.030     Urine Studies:   Recent Labs   Lab 07/28/22 2258   COLORU BROWN   APPEARANCEUA Hazy*   PHUR 6.0   SPECGRAV 1.020   PROTEINUA 1+*   GLUCUA Negative   KETONESU Negative   BILIRUBINUA Negative   OCCULTUA 3+*   NITRITE Negative   UROBILINOGEN Negative   LEUKOCYTESUR Negative   RBCUA >100*   WBCUA 6*   BACTERIA Negative   SQUAMEPITHEL 2   HYALINECASTS 5*       Significant Imaging: I have reviewed all pertinent imaging results/findings within the past 24 hours.  EKG: I have reviewed all pertinent results/findings within the past 24 hours and my personal findings are: sinus bradycardia with FTW in 1, AVL, V4-V6    Assessment/Plan:     Active Hospital Problems    Diagnosis    *Bradycardia    Hematuria    Hypokalemia    Paroxysmal A-fib    Essential hypertension     Plan:    -Admitting for evaluation of sinus bradycardia.  She is in the 40s in the ED and sometimes dipping into the upper 30s.  Blood pressure is actually a little high and thus I am continuing her antihypertensives.  Amiodarone held.  -Cardiology consulted  -Tele monitoring  -Replacing K. Replacing Mg.  -Repeating troponin for completeness but workup thus far does not suggest cardiac ischemia and she had a normal LHC 6/2022.  -Mild gross hematuria.  It could be due to the increased physical activity in the setting of being on a blood thinner. UA did not suggest UTI.  Monitoring.  I do plan to continue blood thinner at this time  but have started Lovenox instead of xarelto in the event a procedure is required.  If hematuria is persisting, may require Urology follow up and holding of anticoagulant.   -DVT Px with lovenox    VTE Risk Mitigation (From admission, onward)    None             Bishnu Beasley MD  Department of Hospital Medicine   [facility] - Emergency Dept

## 2022-07-29 NOTE — ED PROVIDER NOTES
Encounter Date: 2022       History     Chief Complaint   Patient presents with    Bradycardia     Pt presents to ED with c/o low heart rate at home, states that her HR at home was in the 40's. Denies any SOB or dizziness with low HR.      66-year-old female with a history of atrial fibrillation on Xarelto as well as amiodarone for rate control presents for evaluation of a low heart rate.  The patient states that she was anxious and nervous throughout the day today because of an upcoming change in her living situation.  She is preparing for a moved.  She states that she normally checks her blood pressure 3 times daily.  She did not check her blood pressure heart rate other usual time.  This caused her to become more anxious.  She then checked her vital signs and noted that her heart rate was low which prompted her to present to the emergency room.  States that her heart rate was in the 30s at home.  She did take her amiodarone this evening.        Review of patient's allergies indicates:   Allergen Reactions    Celebrex [celecoxib] Other (See Comments)     Headaches     Past Medical History:   Diagnosis Date    Afib 2021    Afib     Arthritis     Carpal tunnel syndrome     Cataracts, bilateral     Hypertension     Peripheral neuropathy     Seasonal allergies     SVT (supraventricular tachycardia) 2021     Past Surgical History:   Procedure Laterality Date    APPENDECTOMY      CATARACT EXTRACTION       SECTION, CLASSIC      CHOLECYSTECTOMY      CORONARY ANGIOGRAPHY N/A 2022    Procedure: ANGIOGRAM, CORONARY ARTERY;  Surgeon: Ortiz Jarrell MD;  Location: [facility] CATH/EP LAB;  Service: Cardiology;  Laterality: N/A;    LEFT HEART CATHETERIZATION Left 2022    Procedure: CATHETERIZATION, HEART, LEFT;  Surgeon: Ortiz Jarrell MD;  Location: [facility] CATH/EP LAB;  Service: Cardiology;  Laterality: Left;     Family History   Problem Relation Age of Onset    Heart disease Mother      Cancer Mother     Heart disease Father      Social History     Tobacco Use    Smoking status: Never Smoker    Smokeless tobacco: Never Used   Substance Use Topics    Alcohol use: No    Drug use: No     Review of Systems   Constitutional: Positive for fatigue. Negative for activity change, appetite change, chills and fever.   HENT: Negative.  Negative for congestion, dental problem, ear pain, rhinorrhea, sinus pressure, sinus pain, sore throat and trouble swallowing.    Eyes: Negative.  Negative for photophobia, pain, redness and visual disturbance.   Respiratory: Negative.  Negative for cough, chest tightness, shortness of breath and wheezing.    Cardiovascular: Negative.  Negative for chest pain, palpitations and leg swelling.   Gastrointestinal: Negative.  Negative for abdominal distention, abdominal pain, anal bleeding, blood in stool, constipation, diarrhea, nausea and vomiting.   Endocrine: Negative.    Genitourinary: Positive for hematuria and pelvic pain. Negative for decreased urine volume, difficulty urinating, dysuria, flank pain, frequency and urgency.        Very mild suprapubic pressure today.  No current pelvic pain or discomfort.  Noticed blood in urine prior to ED arrival.  No flank pain.   Musculoskeletal: Negative.  Negative for arthralgias, back pain, gait problem, joint swelling, myalgias, neck pain and neck stiffness.   Skin: Negative.  Negative for color change, pallor and rash.   Neurological: Negative.  Negative for dizziness, tremors, seizures, syncope, facial asymmetry, speech difficulty, weakness, light-headedness, numbness and headaches.   Hematological: Negative.  Does not bruise/bleed easily.   Psychiatric/Behavioral: Negative for confusion. The patient is nervous/anxious.         Under stressors secondary to recent preparation for an upcoming move.   All other systems reviewed and are negative.      Physical Exam     Initial Vitals [07/28/22 2231]   BP Pulse Resp Temp SpO2    (!) 149/79 (!) 56 16 98.2 °F (36.8 °C) 98 %      MAP       --         Physical Exam    Nursing note and vitals reviewed.  Constitutional: She appears well-developed and well-nourished. She is not diaphoretic. She is active and cooperative.  Non-toxic appearance. She does not have a sickly appearance. She does not appear ill. No distress.   HENT:   Head: Normocephalic and atraumatic.   Right Ear: Tympanic membrane normal.   Left Ear: Tympanic membrane normal.   Nose: Nose normal.   Mouth/Throat: Uvula is midline, oropharynx is clear and moist and mucous membranes are normal. No oral lesions. No uvula swelling. No oropharyngeal exudate, posterior oropharyngeal edema or posterior oropharyngeal erythema.   Eyes: Conjunctivae, EOM and lids are normal. Pupils are equal, round, and reactive to light. No scleral icterus.   Neck: Trachea normal and phonation normal. Neck supple. No thyroid mass and no thyromegaly present. No stridor present. No JVD present.   Normal range of motion.   Full passive range of motion without pain.     Cardiovascular: Regular rhythm, normal heart sounds, intact distal pulses and normal pulses. Bradycardia present.  Exam reveals no gallop, no distant heart sounds, no friction rub and no decreased pulses.    No murmur heard.  Pulmonary/Chest: Effort normal and breath sounds normal. No accessory muscle usage or stridor. No tachypnea. No respiratory distress. She has no wheezes. She has no rhonchi. She has no rales. She exhibits no tenderness.   Abdominal: Abdomen is soft. Bowel sounds are normal. She exhibits no distension, no pulsatile midline mass and no mass. There is no abdominal tenderness. There is no rigidity, no rebound and no guarding.   Musculoskeletal:         General: No tenderness or edema. Normal range of motion.      Right hand: Normal. Normal range of motion. Normal strength. Normal sensation. Normal capillary refill. Normal pulse.      Left hand: Normal. Normal range of motion.  Normal strength. Normal sensation. Normal capillary refill. Normal pulse.      Cervical back: Normal, full passive range of motion without pain, normal range of motion and neck supple. No edema, erythema, rigidity or bony tenderness. No spinous process tenderness or muscular tenderness. Normal range of motion.      Thoracic back: Normal. No bony tenderness. Normal range of motion.      Lumbar back: Normal. No bony tenderness. Normal range of motion.      Right foot: Normal. Normal range of motion and normal capillary refill. No tenderness or bony tenderness. Normal pulse.      Left foot: Normal. Normal range of motion and normal capillary refill. No tenderness or bony tenderness. Normal pulse.      Comments: Pulses 2+ throughout, no extremity abnormalities     Neurological: She is alert and oriented to person, place, and time. She has normal strength. She displays normal reflexes. No cranial nerve deficit or sensory deficit. Gait normal. GCS score is 15. GCS eye subscore is 4. GCS verbal subscore is 5. GCS motor subscore is 6.   No focal deficits.   Skin: Skin is warm, dry and intact. Capillary refill takes less than 2 seconds. No ecchymosis, no petechiae and no rash noted. No erythema. No pallor.   Psychiatric: She has a normal mood and affect. Her speech is normal and behavior is normal. Judgment and thought content normal. Cognition and memory are normal.         ED Course   Procedures  Labs Reviewed   URINALYSIS, REFLEX TO URINE CULTURE - Abnormal; Notable for the following components:       Result Value    Appearance, UA Hazy (*)     Protein, UA 1+ (*)     Occult Blood UA 3+ (*)     All other components within normal limits    Narrative:     Specimen Source->Urine   COMPREHENSIVE METABOLIC PANEL - Abnormal; Notable for the following components:    Sodium 134 (*)     Potassium 2.8 (*)     Alkaline Phosphatase 46 (*)     All other components within normal limits    Narrative:     POTASSIUM   critical result(s)  called and verbal readback obtained   from  FLORENCIA RENO RN ER. by TC1 07/29/2022 00:28   PROTIME-INR - Abnormal; Notable for the following components:    PT 28.2 (*)     All other components within normal limits   URINALYSIS MICROSCOPIC - Abnormal; Notable for the following components:    RBC, UA >100 (*)     WBC, UA 6 (*)     Hyaline Casts, UA 5 (*)     All other components within normal limits    Narrative:     Specimen Source->Urine   CULTURE, URINE   CULTURE, URINE   APTT   B-TYPE NATRIURETIC PEPTIDE   CBC W/ AUTO DIFFERENTIAL   DRUG SCREEN PANEL, URINE EMERGENCY    Narrative:     Specimen Source->Urine   LIPASE   MAGNESIUM   TROPONIN I   TSH   CK   SARS-COV-2 RNA AMPLIFICATION, QUAL   URINALYSIS          Imaging Results          X-Ray Chest 1 View (In process)                  Medications   magnesium oxide tablet 400 mg (has no administration in time range)   potassium chloride SA CR tablet 20 mEq (has no administration in time range)   potassium chloride SA CR tablet 40 mEq (has no administration in time range)   potassium chloride SA CR tablet 20 mEq (has no administration in time range)   potassium chloride SA CR tablet 40 mEq (has no administration in time range)   magnesium oxide tablet 800 mg (has no administration in time range)   magnesium sulfate 2g in water 50mL IVPB (premix) (has no administration in time range)   magnesium sulfate in dextrose IVPB (premix) 1 g (has no administration in time range)   magnesium sulfate 2g in water 50mL IVPB (premix) (has no administration in time range)   magnesium sulfate 2g in water 50mL IVPB (premix) (has no administration in time range)   sodium phosphate 15 mmol in dextrose 5 % 250 mL IVPB (has no administration in time range)   sodium phosphate 20.01 mmol in dextrose 5 % 250 mL IVPB (has no administration in time range)   sodium phosphate 15 mmol in dextrose 5 % 250 mL IVPB (has no administration in time range)   sodium phosphate 30 mmol in dextrose 5 % 250 mL  IVPB (has no administration in time range)   sodium phosphate 20.01 mmol in dextrose 5 % 250 mL IVPB (has no administration in time range)   calcium chloride 1 g in dextrose 5 % 100 mL IVPB (has no administration in time range)   calcium chloride 1 g in dextrose 5 % 100 mL IVPB (has no administration in time range)   calcium chloride 1 g in dextrose 5 % 100 mL IVPB (has no administration in time range)   potassium bicarbonate disintegrating tablet 20 mEq (has no administration in time range)   potassium bicarbonate disintegrating tablet 50 mEq (50 mEq Oral Given 7/29/22 0100)     Medical Decision Making:   Clinical Tests:   Lab Tests: Reviewed  Radiological Study: Reviewed  Medical Tests: Reviewed  ED Management:  Patient with bradycardia.  No hypoperfusion,--no hypotension, no diaphoresis and is mentating normal however heart rate has at times been dropping into the 40s consistently and several times noted to be briefly in the upper 30s.  Patient on amiodarone for atrial fibrillation.  Did take amiodarone dose this evening.  This will likely need to be held and patient will need to be observed to make certain that bradycardia improves with medication adjustment.  Potassium noted to be low.  Oral potassium given.  Patient with hematuria.  Is on Xarelto.  No evidence of urinary tract infection.  Has been more active than normal lately in while boxing things from moved.  No myalgias, CPK not elevated.  This also will likely need to be monitored.  Will discuss with hospitalist for admission.                      Clinical Impression:   Final diagnoses:  [R00.1] Bradycardia  [E87.6] Hypokalemia (Primary)  [R31.9] Hematuria, unspecified type          ED Disposition Condition    Observation               Fay Dove MD  07/29/22 1213

## 2022-07-29 NOTE — HPI
66 year old female with PMHx Parox afib on Amiodarone and Xarelto presents with bradycardia that she noted on her home blood pressure machine.  She reports she has been preparing to move and has been boxing and lifting these boxes and more active than general as a result.  She denies chest pain, BORGES, SOB or lightheadedness during this increased activity.  While checking her blood pressure at home today, she did note HR in the 40s and wound sometimes dip down into the 30s and this concerned her and thus she came to the ED.  She never had any documentation of hypotension.  Per my chart review, she had sinus bradycardia into the 40s and 50s back in February 2022 after she converted from afib to sinus.  Per Dr. Jarrell's note, she may have SSS.  Patient with [facility] 6/2022 with no significant disease.     In addition to the above, patient noted mild gross hematuria that started yesterday. She did feel some ache in her groin area that she attributed to lifting boxes.  She denies dysuria or frequency.

## 2022-07-29 NOTE — PLAN OF CARE
Patient presented with symptomatic bradycardia.  Heart rate in the 30s to 40s.  Holding amiodarone at this time.  Pending Cardiology consultation.  Patient remains stable and would continue current plan.

## 2022-07-30 VITALS
TEMPERATURE: 99 F | WEIGHT: 191.56 LBS | HEIGHT: 63 IN | HEART RATE: 54 BPM | OXYGEN SATURATION: 98 % | RESPIRATION RATE: 20 BRPM | SYSTOLIC BLOOD PRESSURE: 125 MMHG | BODY MASS INDEX: 33.94 KG/M2 | DIASTOLIC BLOOD PRESSURE: 58 MMHG

## 2022-07-30 LAB
BACTERIA UR CULT: NORMAL
BACTERIA UR CULT: NORMAL
TROPONIN I SERPL DL<=0.01 NG/ML-MCNC: <0.03 NG/ML

## 2022-07-30 PROCEDURE — 36415 COLL VENOUS BLD VENIPUNCTURE: CPT | Performed by: INTERNAL MEDICINE

## 2022-07-30 PROCEDURE — 84484 ASSAY OF TROPONIN QUANT: CPT | Performed by: INTERNAL MEDICINE

## 2022-07-30 PROCEDURE — G0378 HOSPITAL OBSERVATION PER HR: HCPCS

## 2022-07-30 PROCEDURE — 25000003 PHARM REV CODE 250: Performed by: INTERNAL MEDICINE

## 2022-07-30 RX ADMIN — LISINOPRIL 10 MG: 10 TABLET ORAL at 09:07

## 2022-07-30 RX ADMIN — AMLODIPINE BESYLATE 5 MG: 5 TABLET ORAL at 09:07

## 2022-07-30 NOTE — PLAN OF CARE
Problem: Adult Inpatient Plan of Care  Goal: Plan of Care Review  Outcome: Met  Goal: Patient-Specific Goal (Individualized)  Outcome: Met  Goal: Absence of Hospital-Acquired Illness or Injury  Outcome: Met  Goal: Optimal Comfort and Wellbeing  Outcome: Met  Goal: Readiness for Transition of Care  Outcome: Met     Problem: Fall Injury Risk  Goal: Absence of Fall and Fall-Related Injury  Outcome: Met     Problem: Dysrhythmia  Goal: Normalized Cardiac Rhythm  Outcome: Met

## 2022-07-30 NOTE — PLAN OF CARE
07/30/22 1013   Discharge Assessment   Assessment Type Discharge Planning Assessment   Confirmed/corrected address, phone number and insurance Yes   Confirmed Demographics Correct on Facesheet   Source of Information patient   When was your last doctors appointment?   (07/2022)   Does patient/caregiver understand observation status Yes   Communicated DUNIA with patient/caregiver Date not available/Unable to determine   Reason For Admission Bradycardia   Lives With alone   Facility Arrived From: Home   Do you expect to return to your current living situation? Yes   Do you have help at home or someone to help you manage your care at home? No   Prior to hospitilization cognitive status: Alert/Oriented   Current cognitive status: Alert/Oriented   Dressing/Bathing Difficulty none   Equipment Currently Used at Home walker, rolling   Readmission within 30 days? No   Patient currently being followed by outpatient case management? No   Do you currently have service(s) that help you manage your care at home? No   Do you take prescription medications? Yes   Do you have prescription coverage? Yes   Coverage [employer]   Do you have any problems affording any of your prescribed medications? No   Is the patient taking medications as prescribed? yes   Who is going to help you get home at discharge? Driving self home   How do you get to doctors appointments? car, drives self   Are you on dialysis? No   Do you take coumadin? No   Discharge Plan A Home   Discharge Plan B Home   DME Needed Upon Discharge  none   Discharge Plan discussed with: Patient   Discharge Barriers Identified No family/friends to help  (Patient is moving to [place] to be closer to friends/family)   SDOH   (None reported)       Cm met with patient at bedside to complete initial discharge planning assessment. Patient reported preferred pharmacy location as [facility] on [place], [place]. Patient does not have a living will or advanced directive in place.  Patient reports that she is moving to [place], [place] upon discharge to be closer to friends for added support. Confirmed all demographic information. Patient's preferred discharge solution is to return home upon discharge. Patient denies any outpatient services and reports that she is pretty self-sufficient. Patient and CM discussed MOON documentation in relation to the observation status. Patient signed off on documentation.

## 2022-07-30 NOTE — PLAN OF CARE
07/30/22 1245   Final Note   Assessment Type Final Discharge Note   Anticipated Discharge Disposition Home   What phone number can be called within the next 1-3 days to see how you are doing after discharge? [phone]   Post-Acute Status   Coverage [employer]   Discharge Delays None known at this time     CM reviewed the discharge orders. No case management/discharge planning needs noted.     Quinolones Counseling:  I discussed with the patient the risks of fluoroquinolones including but not limited to GI upset, allergic reaction, drug rash, diarrhea, dizziness, photosensitivity, yeast infections, liver function test abnormalities, tendonitis/tendon rupture.

## 2022-07-30 NOTE — DISCHARGE SUMMARY
[facility] Medicine  Discharge Summary      Patient Name: Paula Elmore  MRN: [id number]  Patient Class: OP- Observation  Admission Date: 7/28/2022  Hospital Length of Stay: 0 days  Discharge Date and Time:  07/30/2022 3:05 PM  Attending Physician: No att. providers found   Discharging Provider: Jean-Paul Whipple MD  Primary Care Provider: Ortiz Jarrell MD      HPI:   66 year old female with PMHx Parox afib on Amiodarone and Xarelto presents with bradycardia that she noted on her home blood pressure machine.  She reports she has been preparing to move and has been boxing and lifting these boxes and more active than general as a result.  She denies chest pain, BORGES, SOB or lightheadedness during this increased activity.  While checking her blood pressure at home today, she did note HR in the 40s and wound sometimes dip down into the 30s and this concerned her and thus she came to the ED.  She never had any documentation of hypotension.  Per my chart review, she had sinus bradycardia into the 40s and 50s back in February 2022 after she converted from afib to sinus.  Per Dr. Jarrell's note, she may have SSS.  Patient with [facility] 6/2022 with no significant disease.     In addition to the above, patient noted mild gross hematuria that started yesterday. She did feel some ache in her groin area that she attributed to lifting boxes.  She denies dysuria or frequency.      * No surgery found *      Hospital Course:   Patient is a 66-year-old female with a history of atrial fibrillation who was on amiodarone.  She present to the ED with heart rate in the 40s.  She says that she has not had any symptoms at all.  She was concerned about her heart rate being so low.  She was admitted for observation amiodarone was held.  Her heart rate has improved and she remains asymptomatic.  Her blood pressure is appropriate.  Cardiology was consulted and recommended holding her amiodarone.  She was discharged and will  follow up with her cardiologist as an outpatient.  She may need a pacemaker in the future.  I saw evaluated the patient the day of discharge may be the discharge instructions with her.  I gave her return precautions particularly surrounding symptoms of symptomatic bradycardia.  She was discharged in good condition with plan for follow-up with her outpatient providers.       Goals of Care Treatment Preferences:  Code Status: Full Code      Consults:   Consults (From admission, onward)        Status Ordering Provider     Inpatient consult to Cardiology  Once        Provider:  Ortiz Jarrell MD    Acknowledged CHRISTY TERRELL          No new Assessment & Plan notes have been filed under this hospital service since the last note was generated.  Service: Hospital Medicine    Final Active Diagnoses:    Diagnosis Date Noted POA    PRINCIPAL PROBLEM:  Bradycardia [R00.1] 02/21/2022 Yes    Hematuria [R31.9] 07/29/2022 Yes    Hypokalemia [E87.6] 05/15/2022 Yes    Paroxysmal A-fib [I48.0] 02/21/2022 Yes     Chronic    Essential hypertension [I10] 01/17/2019 Yes      Problems Resolved During this Admission:       Discharged Condition: good    Disposition: Home or Self Care    Follow Up:   Follow-up Information     Ortiz Jarrell MD. Schedule an appointment as soon as possible for a visit in 1 week(s).    Specialties: Cardiology, Interventional Cardiology  Contact information:  [address]  [address]  [facility] [address] [phone]                       Patient Instructions:      Diet Adult Regular     No dressing needed     Activity as tolerated       Significant Diagnostic Studies: Labs:   BMP:   Recent Labs   Lab 07/28/22  2342 07/29/22  0930   GLU 94 86   * 140   K 2.8* 3.7   CL 97 102   CO2 28 31*   BUN 20 17   CREATININE 0.9 0.7   CALCIUM 8.8 9.4   MG 1.7  --    , CBC   Recent Labs   Lab 07/28/22 2342   WBC 5.94   HGB 12.2   HCT 37.1       and All labs within the past 24  hours have been reviewed    Pending Diagnostic Studies:     None         Medications:  Reconciled Home Medications:      Medication List      CONTINUE taking these medications    amLODIPine 5 MG tablet  Commonly known as: NORVASC  Take 5 mg by mouth once daily.     chlorthalidone 25 MG Tab  Commonly known as: HYGROTEN  Take 25 mg by mouth once daily.     citalopram 20 MG tablet  Commonly known as: CeleXA  Take 1 tablet (20 mg total) by mouth once daily.     lisinopriL 10 MG tablet  Take 10 mg by mouth once daily.     rivaroxaban 20 mg Tab  Commonly known as: XARELTO  Take 1 tablet (20 mg total) by mouth daily with dinner or evening meal.        STOP taking these medications    amiodarone 200 MG Tab  Commonly known as: PACERONE     flecainide 100 MG Tab  Commonly known as: TAMBOCOR     hydroCHLOROthiazide 25 MG tablet  Commonly known as: HYDRODIURIL     metoprolol succinate 25 MG 24 hr tablet  Commonly known as: TOPROL-XL            Indwelling Lines/Drains at time of discharge:   Lines/Drains/Airways     None                 Time spent on the discharge of patient: 45 minutes         Jean-Paul Whipple MD  Department of Hospital Medicine  Critical access hospital

## 2022-07-30 NOTE — PROGRESS NOTES
[facility]   Cardiology Note    Consult Requested By:  Hospital medicine  Reason for Consult:  Bradycardia    SUBJECTIVE:     History of Present Illness:  66-year-old female with a history of paroxysmal atrial fibrillation who has been on amiodarone admitted with weakness and heart rates in the 30s.  She denies any chest pains but does have some lightheadedness from time to time she has no hypotension..  At this point amiodarone was held heart rates in the 50s at this time    Review of patient's allergies indicates:   Allergen Reactions    Celebrex [celecoxib] Other (See Comments)     Headaches       Past Medical History:   Diagnosis Date    Afib 2021    Afib     Arthritis     Carpal tunnel syndrome     Cataracts, bilateral     Hypertension     Peripheral neuropathy     Seasonal allergies     SVT (supraventricular tachycardia) 2021     Past Surgical History:   Procedure Laterality Date    APPENDECTOMY      CATARACT EXTRACTION       SECTION, CLASSIC      CHOLECYSTECTOMY      CORONARY ANGIOGRAPHY N/A 2022    Procedure: ANGIOGRAM, CORONARY ARTERY;  Surgeon: Ortiz Jarrell MD;  Location: [facility] CATH/EP LAB;  Service: Cardiology;  Laterality: N/A;    LEFT HEART CATHETERIZATION Left 2022    Procedure: CATHETERIZATION, HEART, LEFT;  Surgeon: Ortiz Jarrell MD;  Location: [facility] CATH/EP LAB;  Service: Cardiology;  Laterality: Left;     Family History   Problem Relation Age of Onset    Heart disease Mother     Cancer Mother     Heart disease Father      Social History     Tobacco Use    Smoking status: Never Smoker    Smokeless tobacco: Never Used   Substance Use Topics    Alcohol use: No    Drug use: No       Review of Systems:  Review of Systems   All other systems reviewed and are negative.      OBJECTIVE:     Vital Signs (Most Recent)  Temp: 98.3 °F (36.8 °C) (22 0740)  Pulse: (!) 41 (22 0740)  Resp: 20 (22 0740)  BP: 124/61 (22  0740)  SpO2: 96 % (07/30/22 0740)    Vital Signs Range (Last 24H):  Temp:  [97.8 °F (36.6 °C)-98.3 °F (36.8 °C)]   Pulse:  [41-48]   Resp:  [10-24]   BP: (108-142)/(57-78)   SpO2:  [96 %-99 %]     I & O (Last 24H):    Intake/Output Summary (Last 24 hours) at 7/30/2022 0816  Last data filed at 7/30/2022 0745  Gross per 24 hour   Intake 610 ml   Output 600 ml   Net 10 ml       Current Diet:     Current Diet Order   Procedures    Diet Cardiac        Allergies:  Review of patient's allergies indicates:   Allergen Reactions    Celebrex [celecoxib] Other (See Comments)     Headaches       Meds:  Scheduled Meds:   amLODIPine  5 mg Oral Daily    chlorthalidone  25 mg Oral Every Other Day    enoxparin  1 mg/kg Subcutaneous Q12H    lisinopriL  10 mg Oral Daily     Continuous Infusions:  PRN Meds:acetaminophen, calcium chloride IVPB, calcium chloride IVPB, calcium chloride IVPB, dextrose 10%, dextrose 10%, glucagon (human recombinant), glucose, glucose, magnesium oxide, magnesium sulfate IVPB, magnesium sulfate IVPB, magnesium sulfate IVPB, magnesium sulfate IVPB, melatonin, naloxone, ondansetron, polyethylene glycol, potassium chloride, potassium chloride, potassium chloride, potassium chloride, sodium chloride 0.9%, sodium phosphate IVPB, sodium phosphate IVPB, sodium phosphate IVPB, sodium phosphate IVPB, sodium phosphate IVPB    Oxygen/Ventilator Data (Last 24H):  (if applicable)        Hemodynamic Parameters (Last 24H):   (if applicable)        Laboratory and Radiology Data:  Recent Results (from the past 24 hour(s))   Basic Metabolic Panel (BMP)    Collection Time: 07/29/22  9:30 AM   Result Value Ref Range    Sodium 140 136 - 145 mmol/L    Potassium 3.7 3.5 - 5.1 mmol/L    Chloride 102 95 - 110 mmol/L    CO2 31 (H) 23 - 29 mmol/L    Glucose 86 70 - 110 mg/dL    BUN 17 8 - 23 mg/dL    Creatinine 0.7 0.5 - 1.4 mg/dL    Calcium 9.4 8.7 - 10.5 mg/dL    Anion Gap 7 (L) 8 - 16 mmol/L    eGFR if African American >60.0  >60 mL/min/1.73 m^2    eGFR if non African American >60.0 >60 mL/min/1.73 m^2   Troponin I    Collection Time: 07/30/22  4:49 AM   Result Value Ref Range    Troponin I <0.030 <=0.040 ng/mL     Imaging Results          X-Ray Chest 1 View (Final result)  Result time 07/29/22 07:02:12    Final result by George Vargas MD (07/29/22 07:02:12)                 Narrative:    XR CHEST 1 VIEW    CLINICAL HISTORY:  66 years Female bradycardia    COMPARISON: May 14, 2022    FINDINGS: Patient is rotated. Within these limitations, cardiac silhouette appears stable compared to prior. No airspace consolidation, interstitial edema, large pleural effusion, or pneumothorax. No acute osseous abnormality.    IMPRESSION: No acute pulmonary pathology.    Electronically signed by:  George Vargas MD  7/29/2022 7:02 AM CDT Workstation: [id number]                              12-lead EKG interpretation:  (if applicable)      Current Cardiac Rhythm:   (if applicable)    Physical Exam:   Physical Exam  Constitutional:       Appearance: Normal appearance.   Cardiovascular:      Rate and Rhythm: Bradycardia present.      Heart sounds: Murmur heard.     No gallop.   Pulmonary:      Effort: Pulmonary effort is normal.      Breath sounds: Normal breath sounds.   Abdominal:      General: Abdomen is flat. Bowel sounds are normal.   Musculoskeletal:         General: No swelling.      Cervical back: No rigidity.   Neurological:      Mental Status: She is alert.       Sinus bradycardia rates 45 diffuse ST segment sagging in the anterior lateral leads  ASSESSMENT/PLAN:   Assessment:   History of sick sinus syndrome  Bradycardia most likely secondary to amiodarone  Paroxysmal atrial fibrillation  Normal left ventricular function with normal coronary arteries  Abnormal EKG  Plan:   Agree with stopping amiodarone.  Patient is asymptomatic at this time and can be discharged.  Will discuss with Dr. Jarrell the need for consideration of a DDD pacemaker

## 2022-07-30 NOTE — HOSPITAL COURSE
Patient is a 66-year-old female with a history of atrial fibrillation who was on amiodarone.  She present to the ED with heart rate in the 40s.  She says that she has not had any symptoms at all.  She was concerned about her heart rate being so low.  She was admitted for observation amiodarone was held.  Her heart rate has improved and she remains asymptomatic.  Her blood pressure is appropriate.  Cardiology was consulted and recommended holding her amiodarone.  She was discharged and will follow up with her cardiologist as an outpatient.  She may need a pacemaker in the future.  I saw evaluated the patient the day of discharge may be the discharge instructions with her.  I gave her return precautions particularly surrounding symptoms of symptomatic bradycardia.  She was discharged in good condition with plan for follow-up with her outpatient providers.

## 2022-07-30 NOTE — PLAN OF CARE
Problem: Adult Inpatient Plan of Care  Goal: Absence of Hospital-Acquired Illness or Injury  Outcome: Ongoing, Progressing  Goal: Optimal Comfort and Wellbeing  Outcome: Ongoing, Progressing  Intervention: Provide Person-Centered Care  Flowsheets (Taken 7/30/2022 0619)  Trust Relationship/Rapport:   care explained   questions encouraged   questions answered     Problem: Fall Injury Risk  Goal: Absence of Fall and Fall-Related Injury  Outcome: Ongoing, Progressing  Intervention: Identify and Manage Contributors  Flowsheets (Taken 7/30/2022 0619)  Self-Care Promotion: independence encouraged  Medication Review/Management: medications reviewed  Intervention: Promote Injury-Free Environment  Flowsheets (Taken 7/30/2022 0619)  Safety Promotion/Fall Prevention:   assistive device/personal item within reach   instructed to call staff for mobility   room near unit station   side rails raised x 2   medications reviewed     Problem: Dysrhythmia  Goal: Normalized Cardiac Rhythm  Outcome: Ongoing, Progressing  Intervention: Monitor and Manage Cardiac Rhythm Effect  Flowsheets (Taken 7/30/2022 0619)  VTE Prevention/Management:   ambulation promoted   bleeding precations maintained   dorsiflexion/plantar flexion performed   fluids promoted

## 2023-01-17 ENCOUNTER — HOSPITAL ENCOUNTER (EMERGENCY)
Facility: HOSPITAL | Age: 68
Discharge: HOME | End: 2023-01-17
Attending: EMERGENCY MEDICINE
Payer: COMMERCIAL

## 2023-01-17 VITALS
HEIGHT: 63 IN | TEMPERATURE: 98 F | WEIGHT: 190 LBS | DIASTOLIC BLOOD PRESSURE: 57 MMHG | SYSTOLIC BLOOD PRESSURE: 127 MMHG | OXYGEN SATURATION: 100 % | HEART RATE: 54 BPM | RESPIRATION RATE: 23 BRPM | BODY MASS INDEX: 33.66 KG/M2

## 2023-01-17 DIAGNOSIS — R42 DIZZINESS: ICD-10-CM

## 2023-01-17 DIAGNOSIS — R07.9 CHEST PAIN: ICD-10-CM

## 2023-01-17 LAB
ALBUMIN SERPL BCP-MCNC: 4 G/DL (ref 3.5–5.2)
ALP SERPL-CCNC: 58 U/L (ref 55–135)
ALT SERPL W/O P-5'-P-CCNC: 16 U/L (ref 10–44)
ANION GAP SERPL CALC-SCNC: 13 MMOL/L (ref 8–16)
AST SERPL-CCNC: 23 U/L (ref 10–40)
BASOPHILS # BLD AUTO: 0.04 K/UL (ref 0–0.2)
BASOPHILS NFR BLD: 0.7 % (ref 0–1.9)
BILIRUB SERPL-MCNC: 0.5 MG/DL (ref 0.1–1)
BUN SERPL-MCNC: 16 MG/DL (ref 8–23)
CALCIUM SERPL-MCNC: 9.5 MG/DL (ref 8.7–10.5)
CHLORIDE SERPL-SCNC: 99 MMOL/L (ref 95–110)
CO2 SERPL-SCNC: 24 MMOL/L (ref 23–29)
CREAT SERPL-MCNC: 0.9 MG/DL (ref 0.5–1.4)
DIFFERENTIAL METHOD: ABNORMAL
EOSINOPHIL # BLD AUTO: 0.1 K/UL (ref 0–0.5)
EOSINOPHIL NFR BLD: 1.3 % (ref 0–8)
ERYTHROCYTE [DISTWIDTH] IN BLOOD BY AUTOMATED COUNT: 17.7 % (ref 11.5–14.5)
EST. GFR  (NO RACE VARIABLE): >60 ML/MIN/1.73 M^2
GLUCOSE SERPL-MCNC: 103 MG/DL (ref 70–110)
HCT VFR BLD AUTO: 33.5 % (ref 37–48.5)
HGB BLD-MCNC: 9.8 G/DL (ref 12–16)
IMM GRANULOCYTES # BLD AUTO: 0.01 K/UL (ref 0–0.04)
IMM GRANULOCYTES NFR BLD AUTO: 0.2 % (ref 0–0.5)
INFLUENZA A, MOLECULAR: NEGATIVE
INFLUENZA B, MOLECULAR: NEGATIVE
LYMPHOCYTES # BLD AUTO: 1 K/UL (ref 1–4.8)
LYMPHOCYTES NFR BLD: 18.6 % (ref 18–48)
MAGNESIUM SERPL-MCNC: 2 MG/DL (ref 1.6–2.6)
MCH RBC QN AUTO: 21.4 PG (ref 27–31)
MCHC RBC AUTO-ENTMCNC: 29.3 G/DL (ref 32–36)
MCV RBC AUTO: 73 FL (ref 82–98)
MONOCYTES # BLD AUTO: 0.4 K/UL (ref 0.3–1)
MONOCYTES NFR BLD: 8 % (ref 4–15)
NEUTROPHILS # BLD AUTO: 3.8 K/UL (ref 1.8–7.7)
NEUTROPHILS NFR BLD: 71.2 % (ref 38–73)
NRBC BLD-RTO: 0 /100 WBC
PLATELET # BLD AUTO: 340 K/UL (ref 150–450)
PMV BLD AUTO: 10.2 FL (ref 9.2–12.9)
POTASSIUM SERPL-SCNC: 4.4 MMOL/L (ref 3.5–5.1)
PROT SERPL-MCNC: 7.5 G/DL (ref 6–8.4)
RBC # BLD AUTO: 4.59 M/UL (ref 4–5.4)
SARS-COV-2 RDRP RESP QL NAA+PROBE: NEGATIVE
SODIUM SERPL-SCNC: 136 MMOL/L (ref 136–145)
SPECIMEN SOURCE: NORMAL
WBC # BLD AUTO: 5.37 K/UL (ref 3.9–12.7)

## 2023-01-17 PROCEDURE — 87502 INFLUENZA DNA AMP PROBE: CPT

## 2023-01-17 PROCEDURE — 25000003 PHARM REV CODE 250: Performed by: EMERGENCY MEDICINE

## 2023-01-17 PROCEDURE — 93010 ELECTROCARDIOGRAM REPORT: CPT | Mod: ,,, | Performed by: SPECIALIST

## 2023-01-17 PROCEDURE — 93010 EKG 12-LEAD: ICD-10-PCS | Mod: ,,, | Performed by: SPECIALIST

## 2023-01-17 PROCEDURE — 93005 ELECTROCARDIOGRAM TRACING: CPT | Performed by: SPECIALIST

## 2023-01-17 PROCEDURE — 80053 COMPREHEN METABOLIC PANEL: CPT

## 2023-01-17 PROCEDURE — 99284 EMERGENCY DEPT VISIT MOD MDM: CPT | Mod: 25

## 2023-01-17 PROCEDURE — 83735 ASSAY OF MAGNESIUM: CPT

## 2023-01-17 PROCEDURE — 96360 HYDRATION IV INFUSION INIT: CPT

## 2023-01-17 PROCEDURE — 85025 COMPLETE CBC W/AUTO DIFF WBC: CPT

## 2023-01-17 PROCEDURE — U0002 COVID-19 LAB TEST NON-CDC: HCPCS

## 2023-01-17 RX ORDER — SODIUM CHLORIDE 9 MG/ML
1000 INJECTION, SOLUTION INTRAVENOUS
Status: COMPLETED | OUTPATIENT
Start: 2023-01-17 | End: 2023-01-17

## 2023-01-17 RX ADMIN — SODIUM CHLORIDE 1000 ML: 0.9 INJECTION, SOLUTION INTRAVENOUS at 03:01

## 2023-01-17 NOTE — ED PROVIDER NOTES
Encounter Date: 2023       History     Chief Complaint   Patient presents with    Dizziness     Onset while bm last pm.      67-year-old female with a history of AFib SVT hypertension presents to the emergency room after an episode of dizziness yesterday.  This is markedly improved today.  This occurred yesterday while having a bowel movement.  Following the bowel movement she felt tired and very dizzy.  She never had slurred speech or facial droop.  Never had any focal extremity weakness.  Symptoms have not returned today.  She does feel very mild dizziness when she gets up and walks around and some sinus pressure.  She reports chronic rhinorrhea.  No facial droop or slurred speech today.  She is compliant with all medications.  She takes magnesium and potassium supplements.  She has never had any chest pain or shortness of breath.  No headache or neck pain or neck stiffness.    The history is provided by the patient.   Review of patient's allergies indicates:   Allergen Reactions    Celebrex [celecoxib] Other (See Comments)     Headaches     Past Medical History:   Diagnosis Date    Afib 2021    Afib     Arthritis     Carpal tunnel syndrome     Cataracts, bilateral     Hypertension     Peripheral neuropathy     Seasonal allergies     SVT (supraventricular tachycardia) 2021     Past Surgical History:   Procedure Laterality Date    APPENDECTOMY      CATARACT EXTRACTION       SECTION, CLASSIC      CHOLECYSTECTOMY      CORONARY ANGIOGRAPHY N/A 2022    Procedure: ANGIOGRAM, CORONARY ARTERY;  Surgeon: Ortiz Jarrell MD;  Location: [facility] CATH/EP LAB;  Service: Cardiology;  Laterality: N/A;    LEFT HEART CATHETERIZATION Left 2022    Procedure: CATHETERIZATION, HEART, LEFT;  Surgeon: Ortiz Jarrell MD;  Location: [facility] CATH/EP LAB;  Service: Cardiology;  Laterality: Left;     Family History   Problem Relation Age of Onset    Heart disease Mother     Cancer Mother     Heart disease Father       Social History     Tobacco Use    Smoking status: Never    Smokeless tobacco: Never   Substance Use Topics    Alcohol use: No    Drug use: No     Review of Systems   HENT:  Positive for congestion and rhinorrhea.    Respiratory: Negative.     Cardiovascular: Negative.    Gastrointestinal: Negative.    Neurological:  Positive for dizziness.   All other systems reviewed and are negative.    Physical Exam     Initial Vitals [01/17/23 1314]   BP Pulse Resp Temp SpO2   121/87 65 17 98 °F (36.7 °C) 100 %      MAP       --         Physical Exam    Nursing note and vitals reviewed.  Constitutional: She appears well-developed and well-nourished. She is not diaphoretic.  Non-toxic appearance. She does not have a sickly appearance. She does not appear ill. No distress.   HENT:   Head: Normocephalic and atraumatic.   Eyes: EOM are normal.   Neck: Neck supple.   Normal range of motion.  Cardiovascular:  Normal rate, regular rhythm and normal heart sounds.     Exam reveals no gallop and no friction rub.       No murmur heard.  Pulmonary/Chest: Breath sounds normal. No respiratory distress. She has no wheezes. She has no rhonchi. She has no rales.   Abdominal: Abdomen is soft. She exhibits no distension. There is no abdominal tenderness. There is no rebound and no guarding.   Musculoskeletal:         General: Normal range of motion.      Cervical back: Normal range of motion and neck supple.     Neurological: She is alert and oriented to person, place, and time.   Normal finger to nose. No nystagmus. No truncal ataxia. No past pointing. Normal gait.       Skin: Skin is warm and dry.   Psychiatric: She has a normal mood and affect. Her behavior is normal. Judgment and thought content normal.       ED Course   Procedures  Labs Reviewed   CBC W/ AUTO DIFFERENTIAL   COMPREHENSIVE METABOLIC PANEL   MAGNESIUM   SARS-COV-2 RNA AMPLIFICATION, QUAL   INFLUENZA A AND B ANTIGEN   POCT GLUCOSE MONITORING CONTINUOUS        ECG Results               EKG 12-lead (In process)  Result time 01/17/23 13:25:43      In process by Interface, Lab In [facility] (01/17/23 13:25:43)                   Narrative:    Test Reason : R42,    Vent. Rate : 061 BPM     Atrial Rate : 061 BPM     P-R Int : 166 ms          QRS Dur : 088 ms      QT Int : 446 ms       P-R-T Axes : 078 025 207 degrees     QTc Int : 448 ms    Normal sinus rhythm  ST and T wave abnormality, consider inferior ischemia  ST and T wave abnormality, consider anterolateral ischemia  Abnormal ECG  When compared with ECG of 28-JUL-2022 22:59,  Premature supraventricular complexes are no longer Present  T wave inversion now evident in Inferior leads    Referred By: [email or web address]   SELF           Confirmed By:                       In process by Interface, Lab In [facility] (01/17/23 13:23:18)                   Narrative:    Test Reason : R42,    Vent. Rate : 061 BPM     Atrial Rate : 061 BPM     P-R Int : 166 ms          QRS Dur : 088 ms      QT Int : 446 ms       P-R-T Axes : 078 025 207 degrees     QTc Int : 448 ms    Normal sinus rhythm  ST and T wave abnormality, consider inferior ischemia  ST and T wave abnormality, consider anterolateral ischemia  Abnormal ECG  When compared with ECG of 28-JUL-2022 22:59,  Premature supraventricular complexes are no longer Present  T wave inversion now evident in Inferior leads    Referred By: [email or web address]   SELF           Confirmed By:                                   Imaging Results              X-Ray Chest PA And Lateral (In process)                      Medications - No data to display  Medical Decision Making:   Clinical Tests:   Lab Tests: Ordered and Reviewed  Radiological Study: Reviewed and Ordered  Medical Tests: Ordered and Reviewed           ED Course as of 01/17/23 1809 Tue Jan 17, 2023   1400 SpO2: 100 % [EF]   1400 Resp: 17 [EF]   1400 Pulse: 65 [EF]   1400 Temp src: Oral [EF]   1400 Temp: 98 °F (36.7 °C) [EF]   1400 BP: 121/87 [EF]   1410 Sinus rhythm 61  beats per minute normal axis no ST elevation or depression T-wave inversion lead 2 and AVF.  Lead 1 V3 V4 V5 V6.  EKG is unchanged from July of last year. [EF]   1423 X-Ray Chest PA And Lateral [EF]   1444 WBC: 5.37 [EF]   1444 Hemoglobin(!): 9.8 [EF]   1444 Platelets: 340 [EF]   1458 SARS-CoV-2 RNA, Amplification, Qual: Negative [EF]   1458 Influenza A, Molecular: Negative [EF]   1458 Influenza B, Molecular: Negative  BP low with standing, IVFs ordered [EF]   1519 Magnesium: 2.0 [EF]   1519 Sodium: 136 [EF]   1519 Potassium: 4.4 [EF]   1519 Chloride: 99 [EF]   1519 CO2: 24 [EF]   1519 Glucose: 103 [EF]   1519 BUN: 16 [EF]   1519 Creatinine: 0.9 [EF]   1519 Calcium: 9.5 [EF]   1519 PROTEIN TOTAL: 7.5 [EF]   1519 Albumin: 4.0 [EF]   1519 BILIRUBIN TOTAL: 0.5 [EF]   1519 Alkaline Phosphatase: 58 [EF]   1519 AST: 23 [EF]   1519 ALT: 16 [EF]   1522 Labs look good, getting IVFs [EF]   1611 67-year-old female presents to the emergency room with an episode of dizziness yesterday after a bowel movement.  Very minimal dizziness today when she gets up and ambulates.  Also some nasal congestion and sinus pressure but flu and COVID are negative.  She is very well-appearing in the emergency room and has no clinical exam findings to raise suspicion for cerebellar pathology at this time.  Maybe some very mild early dehydration?  Feels better with IV fluids.  Hemoglobin is down a little bit from baseline.  She is advised to follow-up with primary care for this.  She denies any bloody stools.  Return [EF]   1612 To the ER symptoms worsen or change. [EF]      ED Course User Index  [EF] Sly Pacheco MD                 Clinical Impression:   Final diagnoses:  [R42] Dizziness  [R07.9] Chest pain               Sly Pacheco MD  01/17/23 7709

## 2023-01-17 NOTE — FIRST PROVIDER EVALUATION
"Medical screening examination initiated.  I have conducted a focused provider triage encounter, findings are as follows:    Brief history of present illness:  Patient presents to the ED for dizziness.  Patient reports on Saturday she strained have a she got lightheaded.  Patient denies any syncope.  Patient reports history of her sinuses started to feel congested and is still dizzy when she stands up.  She denies any fever.  Patient states she is able to have bowel movements that have been a little bit soft.  Patient denies abdominal pain or vomiting.    Vitals:    01/17/23 1314   BP: 121/87   BP Location: Left arm   Patient Position: Sitting   Pulse: 65   Resp: 17   Temp: 98 °F (36.7 °C)   TempSrc: Oral   SpO2: 100%   Weight: 86.2 kg (190 lb)   Height: 5' 3" (1.6 m)       Pertinent physical exam:  Patient is awake and alert in no acute distress.    Brief workup plan:  Lab work, EKG, chest x-ray    Preliminary workup initiated; this workup will be continued and followed by the physician or advanced practice provider that is assigned to the patient when roomed.  "

## 2023-03-04 ENCOUNTER — HOSPITAL ENCOUNTER (EMERGENCY)
Facility: HOSPITAL | Age: 68
Discharge: HOME | End: 2023-03-04
Attending: EMERGENCY MEDICINE
Payer: COMMERCIAL

## 2023-03-04 VITALS
HEART RATE: 50 BPM | DIASTOLIC BLOOD PRESSURE: 67 MMHG | OXYGEN SATURATION: 100 % | SYSTOLIC BLOOD PRESSURE: 135 MMHG | RESPIRATION RATE: 18 BRPM | TEMPERATURE: 99 F | WEIGHT: 194 LBS | BODY MASS INDEX: 34.38 KG/M2 | HEIGHT: 63 IN

## 2023-03-04 DIAGNOSIS — M79.673 FOOT PAIN: ICD-10-CM

## 2023-03-04 DIAGNOSIS — M72.2 PLANTAR FASCIITIS: Primary | ICD-10-CM

## 2023-03-04 PROCEDURE — 99284 EMERGENCY DEPT VISIT MOD MDM: CPT

## 2023-03-04 PROCEDURE — 96372 THER/PROPH/DIAG INJ SC/IM: CPT | Performed by: EMERGENCY MEDICINE

## 2023-03-04 PROCEDURE — 63600175 PHARM REV CODE 636 W HCPCS: Performed by: EMERGENCY MEDICINE

## 2023-03-04 RX ORDER — PREDNISONE 20 MG/1
40 TABLET ORAL DAILY
Qty: 10 TABLET | Refills: 0 | Status: SHIPPED | OUTPATIENT
Start: 2023-03-04 | End: 2023-03-09

## 2023-03-04 RX ORDER — DEXAMETHASONE SODIUM PHOSPHATE 4 MG/ML
8 INJECTION, SOLUTION INTRA-ARTICULAR; INTRALESIONAL; INTRAMUSCULAR; INTRAVENOUS; SOFT TISSUE
Status: COMPLETED | OUTPATIENT
Start: 2023-03-04 | End: 2023-03-04

## 2023-03-04 RX ADMIN — DEXAMETHASONE SODIUM PHOSPHATE 8 MG: 4 INJECTION, SOLUTION INTRA-ARTICULAR; INTRALESIONAL; INTRAMUSCULAR; INTRAVENOUS; SOFT TISSUE at 01:03

## 2023-03-04 NOTE — ED PROVIDER NOTES
Encounter Date: 3/4/2023       History     Chief Complaint   Patient presents with    Foot Injury     Pt c/o foot swelling and pain when she doesn't have her shoes on.      Patient presents complaining of right foot pain.  Patient complains of pain in the arch of her foot pain is worse with walking.  Rest makes it better.    Review of patient's allergies indicates:   Allergen Reactions    Celebrex [celecoxib] Other (See Comments)     Headaches     Past Medical History:   Diagnosis Date    Afib 2021    Afib     Arthritis     Carpal tunnel syndrome     Cataracts, bilateral     Hypertension     Peripheral neuropathy     Seasonal allergies     SVT (supraventricular tachycardia) 2021     Past Surgical History:   Procedure Laterality Date    APPENDECTOMY      CATARACT EXTRACTION       SECTION, CLASSIC      CHOLECYSTECTOMY      CORONARY ANGIOGRAPHY N/A 2022    Procedure: ANGIOGRAM, CORONARY ARTERY;  Surgeon: Ortiz Jarrell MD;  Location: [facility] CATH/EP LAB;  Service: Cardiology;  Laterality: N/A;    LEFT HEART CATHETERIZATION Left 2022    Procedure: CATHETERIZATION, HEART, LEFT;  Surgeon: Ortiz Jarrell MD;  Location: [facility] CATH/EP LAB;  Service: Cardiology;  Laterality: Left;     Family History   Problem Relation Age of Onset    Heart disease Mother     Cancer Mother     Heart disease Father      Social History     Tobacco Use    Smoking status: Never    Smokeless tobacco: Never   Substance Use Topics    Alcohol use: No    Drug use: No     Review of Systems   All other systems reviewed and are negative.    Physical Exam     Initial Vitals [23 1329]   BP Pulse Resp Temp SpO2   138/82 (!) 52 18 98.3 °F (36.8 °C) 100 %      MAP       --         Physical Exam    Nursing note and vitals reviewed.  Constitutional: She appears well-developed and well-nourished.   Pleasant, polite   HENT:   Head: Normocephalic and atraumatic.   Eyes: EOM are normal.   Neck: Neck supple.   Normal range of  motion.  Cardiovascular:  Intact distal pulses.           Pulmonary/Chest: No respiratory distress.   Musculoskeletal:      Cervical back: Normal range of motion and neck supple.      Comments: Patient is tender in the arch of the foot.  There is no obvious deformity erythema or warmth     Neurological: She is alert and oriented to person, place, and time.   Skin: Skin is warm and dry. Capillary refill takes less than 2 seconds.   Psychiatric: She has a normal mood and affect. Her behavior is normal. Judgment and thought content normal.       ED Course   Procedures  Labs Reviewed - No data to display       Imaging Results              X-Ray Foot Complete Right (In process)                      Medications   dexAMETHasone injection 8 mg (8 mg Intramuscular Given 3/4/23 6686)     Medical Decision Making:   Initial Assessment:   No apparent distress  Differential Diagnosis:   Fracture, contusion, tendinitis, plantar fasciitis  ED Management:  MDM    Patient presents for emergent evaluation of acute foot pain that poses a threat to life and/or bodily function.    In the ED patient found to have acute fasciitis.      I ordered X-rays and personally reviewed them and reviewed the radiologist interpretation.  Xray significant for severe arthritis, no fracture.        Discharge MDMPatient was managed in the ED with IM Decadron The response to treatment was improved.  Likely plantar fasciitis no definitive fracture found  Patient was discharged in stable condition.  Detailed return precautions discussed.                        Clinical Impression:   Final diagnoses:  [M79.673] Foot pain  [M72.2] Plantar fasciitis (Primary)        ED Disposition Condition    Discharge Stable          ED Prescriptions       Medication Sig Dispense Start Date End Date Auth. Provider    predniSONE (DELTASONE) 20 MG tablet Take 2 tablets (40 mg total) by mouth once daily. for 5 days 10 tablet 3/4/2023 3/9/2023 Luis Neri MD           Follow-up Information       Follow up With Specialties Details Why Contact Info    Missy Uribe MD Hospitalist, Internal Medicine Schedule an appointment as soon as possible for a visit in 2 days  [address]  [address]  [place] [address]  [phone]      Alec Evangelista MD Orthopedic Surgery Schedule an appointment as soon as possible for a visit in 2 days  [address]  [place] [address]  [phone]               Luis Neri MD  03/04/23 1419

## 2023-06-03 ENCOUNTER — HOSPITAL ENCOUNTER (EMERGENCY)
Facility: HOSPITAL | Age: 68
Discharge: HOME | End: 2023-06-03
Attending: EMERGENCY MEDICINE
Payer: COMMERCIAL

## 2023-06-03 VITALS
HEART RATE: 64 BPM | DIASTOLIC BLOOD PRESSURE: 77 MMHG | RESPIRATION RATE: 18 BRPM | TEMPERATURE: 98 F | OXYGEN SATURATION: 96 % | SYSTOLIC BLOOD PRESSURE: 153 MMHG

## 2023-06-03 DIAGNOSIS — H00.012 HORDEOLUM EXTERNUM OF RIGHT LOWER EYELID: Primary | ICD-10-CM

## 2023-06-03 PROCEDURE — 25000003 PHARM REV CODE 250: Performed by: EMERGENCY MEDICINE

## 2023-06-03 PROCEDURE — 99283 EMERGENCY DEPT VISIT LOW MDM: CPT

## 2023-06-03 RX ORDER — TETRACAINE HYDROCHLORIDE 5 MG/ML
2 SOLUTION OPHTHALMIC
Status: COMPLETED | OUTPATIENT
Start: 2023-06-03 | End: 2023-06-03

## 2023-06-03 RX ORDER — MOXIFLOXACIN 5 MG/ML
1 SOLUTION/ DROPS OPHTHALMIC 3 TIMES DAILY
Qty: 1.4 ML | Refills: 0 | Status: SHIPPED | OUTPATIENT
Start: 2023-06-03 | End: 2023-06-10

## 2023-06-03 RX ADMIN — FLUORESCEIN SODIUM 1 EACH: 1 STRIP OPHTHALMIC at 03:06

## 2023-06-03 RX ADMIN — TETRACAINE HYDROCHLORIDE 2 DROP: 5 SOLUTION OPHTHALMIC at 03:06

## 2023-06-03 NOTE — ED PROVIDER NOTES
Encounter Date: 6/3/2023       History     Chief Complaint   Patient presents with    Eye Problem     Thought it was a sty. Under eye is red and swollen     Patient presents complaining of right eye redness.  Symptoms have been present for the last couple of days.  At the worst symptoms are mild-to-moderate.    Review of patient's allergies indicates:   Allergen Reactions    Celebrex [celecoxib] Other (See Comments)     Headaches     Past Medical History:   Diagnosis Date    Afib 2021    Afib     Arthritis     Carpal tunnel syndrome     Cataracts, bilateral     Hypertension     Peripheral neuropathy     Seasonal allergies     SVT (supraventricular tachycardia) 2021     Past Surgical History:   Procedure Laterality Date    APPENDECTOMY      CATARACT EXTRACTION       SECTION, CLASSIC      CHOLECYSTECTOMY      CORONARY ANGIOGRAPHY N/A 2022    Procedure: ANGIOGRAM, CORONARY ARTERY;  Surgeon: Ortiz Jarrell MD;  Location: [facility] CATH/EP LAB;  Service: Cardiology;  Laterality: N/A;    LEFT HEART CATHETERIZATION Left 2022    Procedure: CATHETERIZATION, HEART, LEFT;  Surgeon: Ortiz Jarrell MD;  Location: [facility] CATH/EP LAB;  Service: Cardiology;  Laterality: Left;     Family History   Problem Relation Age of Onset    Heart disease Mother     Cancer Mother     Heart disease Father      Social History     Tobacco Use    Smoking status: Never    Smokeless tobacco: Never   Substance Use Topics    Alcohol use: No    Drug use: No     Review of Systems   All other systems reviewed and are negative.    Physical Exam     Initial Vitals [23 1519]   BP Pulse Resp Temp SpO2   (!) 153/77 64 18 98 °F (36.7 °C) 96 %      MAP       --         Physical Exam    Nursing note and vitals reviewed.  Constitutional: She appears well-developed and well-nourished.   Pleasant, polite   HENT:   Head: Normocephalic and atraumatic.   Eyes: EOM are normal. Pupils are equal, round, and reactive to light.   The right  lower lid has a stye that has mild swelling and redness.  Fluorescein exam is without uptake or abrasion   Neck: Neck supple.   Normal range of motion.  Cardiovascular:  Intact distal pulses.           Pulmonary/Chest: No respiratory distress.   Musculoskeletal:      Cervical back: Normal range of motion and neck supple.     Neurological: She is alert and oriented to person, place, and time.   Skin: Skin is warm and dry. Capillary refill takes less than 2 seconds.   Psychiatric: She has a normal mood and affect. Her behavior is normal. Judgment and thought content normal.       ED Course   Procedures  Labs Reviewed - No data to display       Imaging Results    None          Medications   TETRAcaine HCl (PF) 0.5 % Drop 2 drop (2 drops Both Eyes Given by Provider 6/3/23 1545)   fluorescein ophthalmic strip 1 each (1 each Left Eye Given 6/3/23 1545)     Medical Decision Making:   Initial Assessment:   Patient is in no distress  Differential Diagnosis:   Abrasion, stye, conjunctivitis  ED Management:  Patient indeed does have a lower lid stye.  Patient was advised warm compresses and we will give Vigamox eyedrops was advised to follow up with Ophthalmology.                        Clinical Impression:   Final diagnoses:  [H00.012] Hordeolum externum of right lower eyelid (Primary)        ED Disposition Condition    Discharge Stable          ED Prescriptions       Medication Sig Dispense Start Date End Date Auth. Provider    moxifloxacin (VIGAMOX) 0.5 % ophthalmic solution Place 1 drop into the right eye 3 (three) times daily. for 7 days 1.4 mL 6/3/2023 6/10/2023 Luis Neri MD          Follow-up Information       Follow up With Specialties Details Why Contact Info    Missy Uribe MD Hospitalist, Internal Medicine Schedule an appointment as soon as possible for a visit in 2 days  [address]  [address]  [place] [address]  [phone]      Jigar Castillo MD Ophthalmology Schedule an appointment  as soon as possible for a visit in 2 days  [address] [facility] [address]  [phone]               Luis Neri MD  06/03/23 7611

## 2023-06-16 DIAGNOSIS — Z12.31 OTHER SCREENING MAMMOGRAM: Primary | ICD-10-CM

## 2023-06-27 ENCOUNTER — HOSPITAL ENCOUNTER (OUTPATIENT)
Dept: RADIOLOGY | Facility: HOSPITAL | Age: 68
Discharge: HOME | End: 2023-06-27
Attending: INTERNAL MEDICINE
Payer: COMMERCIAL

## 2023-06-27 ENCOUNTER — HOSPITAL ENCOUNTER (OUTPATIENT)
Facility: HOSPITAL | Age: 68
Discharge: HOME | End: 2023-06-28
Attending: EMERGENCY MEDICINE | Admitting: HOSPITALIST
Payer: COMMERCIAL

## 2023-06-27 VITALS — HEIGHT: 63 IN | BODY MASS INDEX: 34.38 KG/M2 | WEIGHT: 194 LBS

## 2023-06-27 DIAGNOSIS — R00.1 BRADYCARDIA: Primary | ICD-10-CM

## 2023-06-27 DIAGNOSIS — R07.9 CHEST PAIN: ICD-10-CM

## 2023-06-27 DIAGNOSIS — Z12.31 OTHER SCREENING MAMMOGRAM: ICD-10-CM

## 2023-06-27 DIAGNOSIS — Q24.9 CARDIAC ABNORMALITY: ICD-10-CM

## 2023-06-27 LAB
ALBUMIN SERPL BCP-MCNC: 3.6 G/DL (ref 3.5–5.2)
ALP SERPL-CCNC: 47 U/L (ref 55–135)
ALT SERPL W/O P-5'-P-CCNC: 12 U/L (ref 10–44)
ANION GAP SERPL CALC-SCNC: 8 MMOL/L (ref 8–16)
AST SERPL-CCNC: 16 U/L (ref 10–40)
BASOPHILS # BLD AUTO: 0.04 K/UL (ref 0–0.2)
BASOPHILS NFR BLD: 0.8 % (ref 0–1.9)
BILIRUB SERPL-MCNC: 0.5 MG/DL (ref 0.1–1)
BNP SERPL-MCNC: 34 PG/ML (ref 0–99)
BUN SERPL-MCNC: 17 MG/DL (ref 8–23)
CALCIUM SERPL-MCNC: 8.8 MG/DL (ref 8.7–10.5)
CHLORIDE SERPL-SCNC: 103 MMOL/L (ref 95–110)
CO2 SERPL-SCNC: 24 MMOL/L (ref 23–29)
CREAT SERPL-MCNC: 0.9 MG/DL (ref 0.5–1.4)
DIFFERENTIAL METHOD: ABNORMAL
EOSINOPHIL # BLD AUTO: 0.1 K/UL (ref 0–0.5)
EOSINOPHIL NFR BLD: 2.1 % (ref 0–8)
ERYTHROCYTE [DISTWIDTH] IN BLOOD BY AUTOMATED COUNT: 15 % (ref 11.5–14.5)
EST. GFR  (NO RACE VARIABLE): >60 ML/MIN/1.73 M^2
GLUCOSE SERPL-MCNC: 103 MG/DL (ref 70–110)
GLUCOSE SERPL-MCNC: 98 MG/DL (ref 70–110)
HCT VFR BLD AUTO: 32.9 % (ref 37–48.5)
HGB BLD-MCNC: 11 G/DL (ref 12–16)
IMM GRANULOCYTES # BLD AUTO: 0.01 K/UL (ref 0–0.04)
IMM GRANULOCYTES NFR BLD AUTO: 0.2 % (ref 0–0.5)
LYMPHOCYTES # BLD AUTO: 1.7 K/UL (ref 1–4.8)
LYMPHOCYTES NFR BLD: 32.5 % (ref 18–48)
MAGNESIUM SERPL-MCNC: 1.9 MG/DL (ref 1.6–2.6)
MCH RBC QN AUTO: 29.9 PG (ref 27–31)
MCHC RBC AUTO-ENTMCNC: 33.4 G/DL (ref 32–36)
MCV RBC AUTO: 89 FL (ref 82–98)
MONOCYTES # BLD AUTO: 0.5 K/UL (ref 0.3–1)
MONOCYTES NFR BLD: 8.5 % (ref 4–15)
NEUTROPHILS # BLD AUTO: 3 K/UL (ref 1.8–7.7)
NEUTROPHILS NFR BLD: 55.9 % (ref 38–73)
NRBC BLD-RTO: 0 /100 WBC
PLATELET # BLD AUTO: 251 K/UL (ref 150–450)
PMV BLD AUTO: 10.5 FL (ref 9.2–12.9)
POTASSIUM SERPL-SCNC: 3.5 MMOL/L (ref 3.5–5.1)
PROT SERPL-MCNC: 6.5 G/DL (ref 6–8.4)
RBC # BLD AUTO: 3.68 M/UL (ref 4–5.4)
SODIUM SERPL-SCNC: 135 MMOL/L (ref 136–145)
TROPONIN I SERPL HS-MCNC: 3 PG/ML (ref 0–14.9)
WBC # BLD AUTO: 5.32 K/UL (ref 3.9–12.7)

## 2023-06-27 PROCEDURE — 83735 ASSAY OF MAGNESIUM: CPT | Performed by: EMERGENCY MEDICINE

## 2023-06-27 PROCEDURE — 77067 SCR MAMMO BI INCL CAD: CPT | Mod: TC,PO

## 2023-06-27 PROCEDURE — 93010 ELECTROCARDIOGRAM REPORT: CPT | Mod: ,,, | Performed by: INTERNAL MEDICINE

## 2023-06-27 PROCEDURE — 84484 ASSAY OF TROPONIN QUANT: CPT | Mod: 91 | Performed by: EMERGENCY MEDICINE

## 2023-06-27 PROCEDURE — 82962 GLUCOSE BLOOD TEST: CPT

## 2023-06-27 PROCEDURE — 99285 EMERGENCY DEPT VISIT HI MDM: CPT | Mod: 25

## 2023-06-27 PROCEDURE — 83880 ASSAY OF NATRIURETIC PEPTIDE: CPT | Performed by: EMERGENCY MEDICINE

## 2023-06-27 PROCEDURE — 96360 HYDRATION IV INFUSION INIT: CPT

## 2023-06-27 PROCEDURE — 80053 COMPREHEN METABOLIC PANEL: CPT | Performed by: EMERGENCY MEDICINE

## 2023-06-27 PROCEDURE — 93005 ELECTROCARDIOGRAM TRACING: CPT | Performed by: INTERNAL MEDICINE

## 2023-06-27 PROCEDURE — 85025 COMPLETE CBC W/AUTO DIFF WBC: CPT | Performed by: EMERGENCY MEDICINE

## 2023-06-27 PROCEDURE — 96361 HYDRATE IV INFUSION ADD-ON: CPT

## 2023-06-27 PROCEDURE — 25000003 PHARM REV CODE 250: Performed by: EMERGENCY MEDICINE

## 2023-06-27 PROCEDURE — 93010 EKG 12-LEAD: ICD-10-PCS | Mod: ,,, | Performed by: INTERNAL MEDICINE

## 2023-06-27 RX ORDER — SODIUM CHLORIDE 9 MG/ML
1000 INJECTION, SOLUTION INTRAVENOUS
Status: COMPLETED | OUTPATIENT
Start: 2023-06-27 | End: 2023-06-27

## 2023-06-27 RX ADMIN — SODIUM CHLORIDE 1000 ML: 0.9 INJECTION, SOLUTION INTRAVENOUS at 09:06

## 2023-06-28 ENCOUNTER — CLINICAL SUPPORT (OUTPATIENT)
Dept: CARDIOLOGY | Facility: HOSPITAL | Age: 68
End: 2023-06-28
Payer: COMMERCIAL

## 2023-06-28 VITALS
BODY MASS INDEX: 33.3 KG/M2 | DIASTOLIC BLOOD PRESSURE: 57 MMHG | TEMPERATURE: 98 F | WEIGHT: 188 LBS | OXYGEN SATURATION: 98 % | RESPIRATION RATE: 20 BRPM | SYSTOLIC BLOOD PRESSURE: 122 MMHG | HEART RATE: 54 BPM

## 2023-06-28 VITALS — WEIGHT: 188 LBS | BODY MASS INDEX: 33.31 KG/M2 | HEIGHT: 63 IN

## 2023-06-28 PROBLEM — R00.1 SYMPTOMATIC BRADYCARDIA: Status: ACTIVE | Noted: 2023-06-28

## 2023-06-28 PROBLEM — I48.91 ATRIAL FIBRILLATION: Chronic | Status: RESOLVED | Noted: 2021-09-28 | Resolved: 2023-06-28

## 2023-06-28 PROBLEM — R00.1 SYMPTOMATIC BRADYCARDIA: Status: RESOLVED | Noted: 2023-06-28 | Resolved: 2023-06-28

## 2023-06-28 PROBLEM — I10 ESSENTIAL HYPERTENSION: Chronic | Status: ACTIVE | Noted: 2019-01-17

## 2023-06-28 PROBLEM — I48.91 ATRIAL FIBRILLATION: Chronic | Status: ACTIVE | Noted: 2021-09-28

## 2023-06-28 LAB
ALBUMIN SERPL BCP-MCNC: 3.4 G/DL (ref 3.5–5.2)
ALP SERPL-CCNC: 48 U/L (ref 55–135)
ALT SERPL W/O P-5'-P-CCNC: 12 U/L (ref 10–44)
ANION GAP SERPL CALC-SCNC: 7 MMOL/L (ref 8–16)
AST SERPL-CCNC: 15 U/L (ref 10–40)
BASOPHILS # BLD AUTO: 0.05 K/UL (ref 0–0.2)
BASOPHILS NFR BLD: 1 % (ref 0–1.9)
BILIRUB SERPL-MCNC: 0.5 MG/DL (ref 0.1–1)
BILIRUB UR QL STRIP: NEGATIVE
BUN SERPL-MCNC: 21 MG/DL (ref 8–23)
CALCIUM SERPL-MCNC: 8.8 MG/DL (ref 8.7–10.5)
CHLORIDE SERPL-SCNC: 107 MMOL/L (ref 95–110)
CLARITY UR: CLEAR
CO2 SERPL-SCNC: 26 MMOL/L (ref 23–29)
COLOR UR: COLORLESS
CREAT SERPL-MCNC: 0.8 MG/DL (ref 0.5–1.4)
DIFFERENTIAL METHOD: ABNORMAL
EOSINOPHIL # BLD AUTO: 0.2 K/UL (ref 0–0.5)
EOSINOPHIL NFR BLD: 3.8 % (ref 0–8)
ERYTHROCYTE [DISTWIDTH] IN BLOOD BY AUTOMATED COUNT: 15.2 % (ref 11.5–14.5)
EST. GFR  (NO RACE VARIABLE): >60 ML/MIN/1.73 M^2
GLUCOSE SERPL-MCNC: 100 MG/DL (ref 70–110)
GLUCOSE UR QL STRIP: NEGATIVE
HCT VFR BLD AUTO: 31 % (ref 37–48.5)
HGB BLD-MCNC: 10.5 G/DL (ref 12–16)
HGB UR QL STRIP: NEGATIVE
IMM GRANULOCYTES # BLD AUTO: 0.01 K/UL (ref 0–0.04)
IMM GRANULOCYTES NFR BLD AUTO: 0.2 % (ref 0–0.5)
KETONES UR QL STRIP: NEGATIVE
LEUKOCYTE ESTERASE UR QL STRIP: NEGATIVE
LYMPHOCYTES # BLD AUTO: 1.2 K/UL (ref 1–4.8)
LYMPHOCYTES NFR BLD: 23.5 % (ref 18–48)
MCH RBC QN AUTO: 30.6 PG (ref 27–31)
MCHC RBC AUTO-ENTMCNC: 33.9 G/DL (ref 32–36)
MCV RBC AUTO: 90 FL (ref 82–98)
MONOCYTES # BLD AUTO: 0.4 K/UL (ref 0.3–1)
MONOCYTES NFR BLD: 7.5 % (ref 4–15)
NEUTROPHILS # BLD AUTO: 3.3 K/UL (ref 1.8–7.7)
NEUTROPHILS NFR BLD: 64 % (ref 38–73)
NITRITE UR QL STRIP: NEGATIVE
NRBC BLD-RTO: 0 /100 WBC
PH UR STRIP: 6 [PH] (ref 5–8)
PLATELET # BLD AUTO: 231 K/UL (ref 150–450)
PMV BLD AUTO: 10.5 FL (ref 9.2–12.9)
POTASSIUM SERPL-SCNC: 3.6 MMOL/L (ref 3.5–5.1)
PROT SERPL-MCNC: 6.2 G/DL (ref 6–8.4)
PROT UR QL STRIP: NEGATIVE
RBC # BLD AUTO: 3.43 M/UL (ref 4–5.4)
SODIUM SERPL-SCNC: 140 MMOL/L (ref 136–145)
SP GR UR STRIP: 1 (ref 1–1.03)
TROPONIN I SERPL HS-MCNC: 3.2 PG/ML (ref 0–14.9)
TSH SERPL DL<=0.005 MIU/L-ACNC: 5.56 UIU/ML (ref 0.34–5.6)
URN SPEC COLLECT METH UR: ABNORMAL
UROBILINOGEN UR STRIP-ACNC: NEGATIVE EU/DL
WBC # BLD AUTO: 5.2 K/UL (ref 3.9–12.7)

## 2023-06-28 PROCEDURE — 80053 COMPREHEN METABOLIC PANEL: CPT | Performed by: NURSE PRACTITIONER

## 2023-06-28 PROCEDURE — 84443 ASSAY THYROID STIM HORMONE: CPT | Performed by: NURSE PRACTITIONER

## 2023-06-28 PROCEDURE — 93306 ECHO (CUPID ONLY): ICD-10-PCS | Mod: 26,,, | Performed by: INTERNAL MEDICINE

## 2023-06-28 PROCEDURE — 25000003 PHARM REV CODE 250: Performed by: INTERNAL MEDICINE

## 2023-06-28 PROCEDURE — G0378 HOSPITAL OBSERVATION PER HR: HCPCS

## 2023-06-28 PROCEDURE — 81003 URINALYSIS AUTO W/O SCOPE: CPT | Performed by: EMERGENCY MEDICINE

## 2023-06-28 PROCEDURE — 96361 HYDRATE IV INFUSION ADD-ON: CPT

## 2023-06-28 PROCEDURE — 97161 PT EVAL LOW COMPLEX 20 MIN: CPT

## 2023-06-28 PROCEDURE — 93306 TTE W/DOPPLER COMPLETE: CPT | Mod: 26,,, | Performed by: INTERNAL MEDICINE

## 2023-06-28 PROCEDURE — 93306 TTE W/DOPPLER COMPLETE: CPT

## 2023-06-28 PROCEDURE — 97116 GAIT TRAINING THERAPY: CPT

## 2023-06-28 PROCEDURE — 36415 COLL VENOUS BLD VENIPUNCTURE: CPT | Performed by: NURSE PRACTITIONER

## 2023-06-28 PROCEDURE — 85025 COMPLETE CBC W/AUTO DIFF WBC: CPT | Performed by: NURSE PRACTITIONER

## 2023-06-28 RX ORDER — SODIUM CHLORIDE 0.9 % (FLUSH) 0.9 %
10 SYRINGE (ML) INJECTION
Status: DISCONTINUED | OUTPATIENT
Start: 2023-06-28 | End: 2023-06-28 | Stop reason: HOSPADM

## 2023-06-28 RX ORDER — AMIODARONE HYDROCHLORIDE 200 MG/1
200 TABLET ORAL DAILY
COMMUNITY
Start: 2023-06-15 | End: 2023-06-28

## 2023-06-28 RX ORDER — ACETAMINOPHEN 325 MG/1
650 TABLET ORAL EVERY 8 HOURS PRN
Status: DISCONTINUED | OUTPATIENT
Start: 2023-06-28 | End: 2023-06-28 | Stop reason: HOSPADM

## 2023-06-28 RX ORDER — TALC
6 POWDER (GRAM) TOPICAL NIGHTLY PRN
Status: DISCONTINUED | OUTPATIENT
Start: 2023-06-28 | End: 2023-06-28 | Stop reason: HOSPADM

## 2023-06-28 RX ORDER — SODIUM CHLORIDE 9 MG/ML
INJECTION, SOLUTION INTRAVENOUS CONTINUOUS
Status: DISCONTINUED | OUTPATIENT
Start: 2023-06-28 | End: 2023-06-28

## 2023-06-28 RX ORDER — ONDANSETRON 2 MG/ML
4 INJECTION INTRAMUSCULAR; INTRAVENOUS EVERY 8 HOURS PRN
Status: DISCONTINUED | OUTPATIENT
Start: 2023-06-28 | End: 2023-06-28 | Stop reason: HOSPADM

## 2023-06-28 RX ORDER — PROCHLORPERAZINE EDISYLATE 5 MG/ML
5 INJECTION INTRAMUSCULAR; INTRAVENOUS EVERY 6 HOURS PRN
Status: DISCONTINUED | OUTPATIENT
Start: 2023-06-28 | End: 2023-06-28 | Stop reason: HOSPADM

## 2023-06-28 RX ORDER — POTASSIUM CHLORIDE 1500 MG/1
20 TABLET, EXTENDED RELEASE ORAL DAILY
Status: ON HOLD | COMMUNITY
Start: 2023-04-10 | End: 2023-09-19 | Stop reason: HOSPADM

## 2023-06-28 RX ORDER — FERROUS SULFATE 325(65) MG
1 TABLET ORAL DAILY
COMMUNITY
Start: 2023-01-31

## 2023-06-28 RX ORDER — CYANOCOBALAMIN 1000 UG/ML
1000 INJECTION, SOLUTION INTRAMUSCULAR; SUBCUTANEOUS
Status: ON HOLD | COMMUNITY
Start: 2023-05-30 | End: 2023-09-19 | Stop reason: HOSPADM

## 2023-06-28 RX ORDER — LANOLIN ALCOHOL/MO/W.PET/CERES
1 CREAM (GRAM) TOPICAL DAILY
COMMUNITY
Start: 2022-11-30

## 2023-06-28 RX ADMIN — SODIUM CHLORIDE 1000 ML: 0.9 INJECTION, SOLUTION INTRAVENOUS at 08:06

## 2023-06-28 NOTE — SUBJECTIVE & OBJECTIVE
Past Medical History:   Diagnosis Date    Afib 2021    Afib     Anticoagulant long-term use     Arthritis     Carpal tunnel syndrome     Cataracts, bilateral     Hypertension     Peripheral neuropathy     Seasonal allergies     SVT (supraventricular tachycardia) 2021       Past Surgical History:   Procedure Laterality Date    APPENDECTOMY      CATARACT EXTRACTION       SECTION, CLASSIC      CHOLECYSTECTOMY      CORONARY ANGIOGRAPHY N/A 2022    Procedure: ANGIOGRAM, CORONARY ARTERY;  Surgeon: Ortiz Jarrell MD;  Location: [facility] CATH/EP LAB;  Service: Cardiology;  Laterality: N/A;    LEFT HEART CATHETERIZATION Left 2022    Procedure: CATHETERIZATION, HEART, LEFT;  Surgeon: Ortiz Jarrell MD;  Location: [facility] CATH/EP LAB;  Service: Cardiology;  Laterality: Left;       Review of patient's allergies indicates:   Allergen Reactions    Celebrex [celecoxib] Other (See Comments)     Headaches       No current facility-administered medications on file prior to encounter.     Current Outpatient Medications on File Prior to Encounter   Medication Sig    amLODIPine (NORVASC) 5 MG tablet Take 5 mg by mouth once daily.    chlorthalidone (HYGROTEN) 25 MG Tab Take 25 mg by mouth once daily.    citalopram (CELEXA) 20 MG tablet Take 1 tablet (20 mg total) by mouth once daily.    lisinopriL 10 MG tablet Take 10 mg by mouth once daily.    rivaroxaban (XARELTO) 20 mg Tab Take 1 tablet (20 mg total) by mouth daily with dinner or evening meal.    [DISCONTINUED] amiodarone (PACERONE) 200 MG Tab Take 200 mg by mouth 2 (two) times daily.    [DISCONTINUED] flecainide (TAMBOCOR) 100 MG Tab Take 1 tablet (100 mg total) by mouth every 12 (twelve) hours. (Patient not taking: No sig reported)    [DISCONTINUED] hydroCHLOROthiazide (HYDRODIURIL) 25 MG tablet TAKE 1 TABLET BY MOUTH EVERY DAY    [DISCONTINUED] metoprolol succinate (TOPROL-XL) 25 MG 24 hr tablet Take 1 tablet (25 mg total) by mouth once daily.     Family  History       Problem Relation (Age of Onset)    Breast cancer Mother    Cancer Mother    Heart disease Mother, Father          Tobacco Use    Smoking status: Never    Smokeless tobacco: Never   Substance and Sexual Activity    Alcohol use: No    Drug use: No    Sexual activity: Not Currently     Review of Systems   Constitutional:  Positive for fatigue. Negative for activity change, appetite change, chills, diaphoresis, fever and unexpected weight change.   HENT:  Negative for congestion, dental problem, facial swelling, nosebleeds, sinus pressure, sinus pain, sore throat and trouble swallowing.    Eyes:  Negative for pain and redness.   Respiratory:  Negative for apnea, cough, chest tightness, shortness of breath and wheezing.    Cardiovascular:  Negative for chest pain, palpitations and leg swelling.   Gastrointestinal:  Negative for abdominal distention, abdominal pain, anal bleeding, blood in stool, constipation, diarrhea, nausea and vomiting.   Endocrine: Negative for polyphagia and polyuria.   Genitourinary:  Negative for decreased urine volume, difficulty urinating, dysuria, frequency, hematuria and urgency.   Musculoskeletal:  Negative for back pain, gait problem, joint swelling, myalgias, neck pain and neck stiffness.   Skin:  Negative for color change, pallor, rash and wound.   Neurological:  Positive for dizziness and light-headedness. Negative for seizures, syncope, facial asymmetry, speech difficulty, weakness, numbness and headaches.   Psychiatric/Behavioral:  Negative for agitation, behavioral problems, confusion, hallucinations, sleep disturbance and suicidal ideas.    Objective:     Vital Signs (Most Recent):  Temp: 98.3 °F (36.8 °C) (06/27/23 2022)  Pulse: (!) 47 (06/28/23 0130)  Resp: 14 (06/28/23 0130)  BP: (!) 117/56 (06/28/23 0130)  SpO2: 97 % (06/28/23 0130) Vital Signs (24h Range):  Temp:  [98.3 °F (36.8 °C)] 98.3 °F (36.8 °C)  Pulse:  [45-62] 47  Resp:  [12-18] 14  SpO2:  [96 %-99 %] 97  %  BP: (112-149)/(53-73) 117/56     Weight: 85.3 kg (188 lb)  Body mass index is 33.3 kg/m².     Physical Exam  Vitals reviewed.   Constitutional:       General: She is not in acute distress.     Appearance: Normal appearance. She is not ill-appearing, toxic-appearing or diaphoretic.   HENT:      Head: Normocephalic.      Right Ear: External ear normal.      Left Ear: External ear normal.      Nose: No congestion or rhinorrhea.      Mouth/Throat:      Mouth: Mucous membranes are moist.      Pharynx: Oropharynx is clear. No oropharyngeal exudate or posterior oropharyngeal erythema.   Eyes:      Extraocular Movements: Extraocular movements intact.      Conjunctiva/sclera: Conjunctivae normal.      Pupils: Pupils are equal, round, and reactive to light.   Cardiovascular:      Rate and Rhythm: Regular rhythm. Bradycardia present.      Pulses: Normal pulses.      Heart sounds: Normal heart sounds.   Pulmonary:      Effort: Pulmonary effort is normal.      Breath sounds: Normal breath sounds.   Abdominal:      General: Abdomen is flat. Bowel sounds are normal.      Palpations: Abdomen is soft.   Genitourinary:     Rectum: Normal.   Musculoskeletal:         General: Normal range of motion.      Cervical back: Normal range of motion and neck supple.   Skin:     General: Skin is warm and dry.      Capillary Refill: Capillary refill takes less than 2 seconds.   Neurological:      Mental Status: She is alert and oriented to person, place, and time.   Psychiatric:         Mood and Affect: Mood normal.            CRANIAL NERVES     CN III, IV, VI   Pupils are equal, round, and reactive to light.     Significant Labs: All pertinent labs within the past 24 hours have been reviewed.  Recent Lab Results         06/28/23  0133   06/27/23  2349   06/27/23  2347   06/27/23  2113        Albumin       3.6       Alkaline Phosphatase       47       ALT       12       Anion Gap       8       Appearance, UA Clear             AST       16        Baso #       0.04       Basophil %       0.8       Bilirubin (UA) Negative             BILIRUBIN TOTAL       0.5  Comment: For infants and newborns, interpretation of results should be based  on gestational age, weight and in agreement with clinical  observations.    Premature Infant recommended reference ranges:  Up to 24 hours.............<8.0 mg/dL  Up to 48 hours............<12.0 mg/dL  3-5 days..................<15.0 mg/dL  6-29 days.................<15.0 mg/dL         BNP       34  Comment: Values of less than 100 pg/ml are consistent with non-CHF populations.       BUN       17       Calcium       8.8       Chloride       103       CO2       24       Color, UA Colorless             Creatinine       0.9       Differential Method       Automated       eGFR       >60.0       Eos #       0.1       Eosinophil %       2.1       Glucose       98       Glucose, UA Negative             Gran # (ANC)       3.0       Gran %       55.9       Hematocrit       32.9       Hemoglobin       11.0       Immature Grans (Abs)       0.01  Comment: Mild elevation in immature granulocytes is non specific and   can be seen in a variety of conditions including stress response,   acute inflammation, trauma and pregnancy. Correlation with other   laboratory and clinical findings is essential.         Immature Granulocytes       0.2       Ketones, UA Negative             Leukocytes, UA Negative             Lymph #       1.7       Lymph %       32.5       Magnesium       1.9       MCH       29.9       MCHC       33.4       MCV       89       Mono #       0.5       Mono %       8.5       MPV       10.5       NITRITE UA Negative             nRBC       0       Occult Blood UA Negative             pH, UA 6.0             Platelets       251       POC Glucose     103         Potassium       3.5       PROTEIN TOTAL       6.5       Protein, UA Negative  Comment: Recommend a 24 hour urine protein or a urine   protein/creatinine ratio if globulin  induced proteinuria is  clinically suspected.               RBC       3.68       RDW       15.0       Sodium       135       Specific Walker, UA 1.005             Specimen UA Urine, Clean Catch             Troponin I High Sensitivity   3.2  Comment: Troponin results differ between methods. Do not use   results between Troponin methods interchangeably.    Alkaline Phospatase levels above 400 U/L may   cause false positive results.    Access hsTnI should not be used for patients taking   Asfotase kj (Strensiq).       3.0  Comment: Troponin results differ between methods. Do not use   results between Troponin methods interchangeably.    Alkaline Phospatase levels above 400 U/L may   cause false positive results.    Access hsTnI should not be used for patients taking   Asfotase kj (Strensiq).         UROBILINOGEN UA Negative             WBC       5.32               Significant Imaging: I have reviewed all pertinent imaging results/findings within the past 24 hours.

## 2023-06-28 NOTE — PROGRESS NOTES
67 year old female with parox afib on Amiodarone, Xarelto presents to the ED feeling weak with mild HA.  She was in a car without AC for multiple hours today.  She has also noted at home that SBP has been running in the low 100s when it normally is -120.  HR is mostly in the low 50s. She denies any recent changes in her medication. She has been eating and drinking well. She is going to the gym.  In the ED on admit, HR in the low 40s, SBP low 100s. She is dizzy on changing positions suggesting orthostasis and SBP did go from 149 to 118 from sitting to standing.  She received 1L NS in the ED and IVFs ordered overnight but does not appear to be have been given per MAR.  I've ordered a NS bolus instead, then.  Her home medication of Amiodarone, Norvasc, Chlorthalidone, Lisinopril have been held.  I've consulted Cardiology for bradycardia though I do see she was admitted for the same in 7/2022.  At that time, her Amiodarone was held and she followed up with Dr. Jarrell. She tells me she wasn't sure why her Amiodarone was held and thus was started back on it without issue until now. We discussed that her Amiodarone is now being placed on hold for her low heart rate.  I've been told Dr. Jarrell is not seeing patients at [facility] and thus I have consulted Cardiology on call.  PT has been consulted to assist in ambulating Mrs. Elmore to see how she is going to feel. I have ordered a TSH.  Other labs are WNL including troponin x 2.  Echo has been performed with read pending.  EKG with sinus bradycardia.      Exam:    Alert, NAD, mentating appropriately  RRR  CTA B  Minimal dough like BLE edema  Abd is non distended  No joint effusion  No dixon  No focal neuro deficits

## 2023-06-28 NOTE — H&P
[facility] - Emergency Dept  Hospital Medicine  History & Physical    Patient Name: Paula Elmore  MRN: [id number]  Patient Class: OP- Observation  Admission Date: 6/27/2023  Attending Physician: Adia Orr MD   Primary Care Provider: Missy Liu MD         Patient information was obtained from patient and ER records.     Subjective:     Principal Problem:Symptomatic bradycardia    Chief Complaint:   Chief Complaint   Patient presents with    Dizziness     States was in heat all day in car with no air conditioning. States she is worried she is dehydrated     Headache        HPI: Patient is a 67-year-old female with history of AFib on Xarelto, amiodarone, hypertension.  She presents to the ED with complaints of weakness, lightheadedness, dizziness, frontal headache and dizzy on standing.  On evaluation in ED heart rate 47-52, troponin 3.2, BNP 34,  blood pressure soft, dizziness upon standing.  IV fluid bolus 1 L ordered in ED.    On assessment patient is alert oriented x3 but complains about increased weakness and lightheadedness on standing.  She reports prior to coming to ED she had increased weakness in all her extremities.  Patient states she is been driving around in the car with no air condition for 2 hours with more and felt increased weakness and dizziness today.  She reports on Monday her heart rate was normal but noticed last couple of days her blood pressure has been lower than normal.  She reports a cardiologist is Dr. Jarrell who address possible pacemaker placement.  She denies chest pain, dyspnea, nausea, vomiting, fever, chills.  She denies alcohol use, illicit drugs, smoking.    Hospitalist asked to admit for symptomatic bradycardia, echo, maintenance IV fluid., orthostatic blood pressure      Past Medical History:   Diagnosis Date    Afib 09/28/2021    Afib     Anticoagulant long-term use     Arthritis     Carpal tunnel syndrome     Cataracts, bilateral      Hypertension     Peripheral neuropathy     Seasonal allergies     SVT (supraventricular tachycardia) 2021       Past Surgical History:   Procedure Laterality Date    APPENDECTOMY      CATARACT EXTRACTION       SECTION, CLASSIC      CHOLECYSTECTOMY      CORONARY ANGIOGRAPHY N/A 2022    Procedure: ANGIOGRAM, CORONARY ARTERY;  Surgeon: Ortiz Jarrell MD;  Location: [facility] CATH/EP LAB;  Service: Cardiology;  Laterality: N/A;    LEFT HEART CATHETERIZATION Left 2022    Procedure: CATHETERIZATION, HEART, LEFT;  Surgeon: Ortiz Jarrlel MD;  Location: [facility] CATH/EP LAB;  Service: Cardiology;  Laterality: Left;       Review of patient's allergies indicates:   Allergen Reactions    Celebrex [celecoxib] Other (See Comments)     Headaches       No current facility-administered medications on file prior to encounter.     Current Outpatient Medications on File Prior to Encounter   Medication Sig    amLODIPine (NORVASC) 5 MG tablet Take 5 mg by mouth once daily.    chlorthalidone (HYGROTEN) 25 MG Tab Take 25 mg by mouth once daily.    citalopram (CELEXA) 20 MG tablet Take 1 tablet (20 mg total) by mouth once daily.    lisinopriL 10 MG tablet Take 10 mg by mouth once daily.    rivaroxaban (XARELTO) 20 mg Tab Take 1 tablet (20 mg total) by mouth daily with dinner or evening meal.    [DISCONTINUED] amiodarone (PACERONE) 200 MG Tab Take 200 mg by mouth 2 (two) times daily.    [DISCONTINUED] flecainide (TAMBOCOR) 100 MG Tab Take 1 tablet (100 mg total) by mouth every 12 (twelve) hours. (Patient not taking: No sig reported)    [DISCONTINUED] hydroCHLOROthiazide (HYDRODIURIL) 25 MG tablet TAKE 1 TABLET BY MOUTH EVERY DAY    [DISCONTINUED] metoprolol succinate (TOPROL-XL) 25 MG 24 hr tablet Take 1 tablet (25 mg total) by mouth once daily.     Family History       Problem Relation (Age of Onset)    Breast cancer Mother    Cancer Mother    Heart disease Mother, Father          Tobacco Use    Smoking  status: Never    Smokeless tobacco: Never   Substance and Sexual Activity    Alcohol use: No    Drug use: No    Sexual activity: Not Currently     Review of Systems   Constitutional:  Positive for fatigue. Negative for activity change, appetite change, chills, diaphoresis, fever and unexpected weight change.   HENT:  Negative for congestion, dental problem, facial swelling, nosebleeds, sinus pressure, sinus pain, sore throat and trouble swallowing.    Eyes:  Negative for pain and redness.   Respiratory:  Negative for apnea, cough, chest tightness, shortness of breath and wheezing.    Cardiovascular:  Negative for chest pain, palpitations and leg swelling.   Gastrointestinal:  Negative for abdominal distention, abdominal pain, anal bleeding, blood in stool, constipation, diarrhea, nausea and vomiting.   Endocrine: Negative for polyphagia and polyuria.   Genitourinary:  Negative for decreased urine volume, difficulty urinating, dysuria, frequency, hematuria and urgency.   Musculoskeletal:  Negative for back pain, gait problem, joint swelling, myalgias, neck pain and neck stiffness.   Skin:  Negative for color change, pallor, rash and wound.   Neurological:  Positive for dizziness and light-headedness. Negative for seizures, syncope, facial asymmetry, speech difficulty, weakness, numbness and headaches.   Psychiatric/Behavioral:  Negative for agitation, behavioral problems, confusion, hallucinations, sleep disturbance and suicidal ideas.    Objective:     Vital Signs (Most Recent):  Temp: 98.3 °F (36.8 °C) (06/27/23 2022)  Pulse: (!) 47 (06/28/23 0130)  Resp: 14 (06/28/23 0130)  BP: (!) 117/56 (06/28/23 0130)  SpO2: 97 % (06/28/23 0130) Vital Signs (24h Range):  Temp:  [98.3 °F (36.8 °C)] 98.3 °F (36.8 °C)  Pulse:  [45-62] 47  Resp:  [12-18] 14  SpO2:  [96 %-99 %] 97 %  BP: (112-149)/(53-73) 117/56     Weight: 85.3 kg (188 lb)  Body mass index is 33.3 kg/m².     Physical Exam  Vitals reviewed.   Constitutional:        General: She is not in acute distress.     Appearance: Normal appearance. She is not ill-appearing, toxic-appearing or diaphoretic.   HENT:      Head: Normocephalic.      Right Ear: External ear normal.      Left Ear: External ear normal.      Nose: No congestion or rhinorrhea.      Mouth/Throat:      Mouth: Mucous membranes are moist.      Pharynx: Oropharynx is clear. No oropharyngeal exudate or posterior oropharyngeal erythema.   Eyes:      Extraocular Movements: Extraocular movements intact.      Conjunctiva/sclera: Conjunctivae normal.      Pupils: Pupils are equal, round, and reactive to light.   Cardiovascular:      Rate and Rhythm: Regular rhythm. Bradycardia present.      Pulses: Normal pulses.      Heart sounds: Normal heart sounds.   Pulmonary:      Effort: Pulmonary effort is normal.      Breath sounds: Normal breath sounds.   Abdominal:      General: Abdomen is flat. Bowel sounds are normal.      Palpations: Abdomen is soft.   Genitourinary:     Rectum: Normal.   Musculoskeletal:         General: Normal range of motion.      Cervical back: Normal range of motion and neck supple.   Skin:     General: Skin is warm and dry.      Capillary Refill: Capillary refill takes less than 2 seconds.   Neurological:      Mental Status: She is alert and oriented to person, place, and time.   Psychiatric:         Mood and Affect: Mood normal.            CRANIAL NERVES     CN III, IV, VI   Pupils are equal, round, and reactive to light.     Significant Labs: All pertinent labs within the past 24 hours have been reviewed.  Recent Lab Results         06/28/23  0133   06/27/23  2349   06/27/23  2347   06/27/23  2113        Albumin       3.6       Alkaline Phosphatase       47       ALT       12       Anion Gap       8       Appearance, UA Clear             AST       16       Baso #       0.04       Basophil %       0.8       Bilirubin (UA) Negative             BILIRUBIN TOTAL       0.5  Comment: For infants and newborns,  interpretation of results should be based  on gestational age, weight and in agreement with clinical  observations.    Premature Infant recommended reference ranges:  Up to 24 hours.............<8.0 mg/dL  Up to 48 hours............<12.0 mg/dL  3-5 days..................<15.0 mg/dL  6-29 days.................<15.0 mg/dL         BNP       34  Comment: Values of less than 100 pg/ml are consistent with non-CHF populations.       BUN       17       Calcium       8.8       Chloride       103       CO2       24       Color, UA Colorless             Creatinine       0.9       Differential Method       Automated       eGFR       >60.0       Eos #       0.1       Eosinophil %       2.1       Glucose       98       Glucose, UA Negative             Gran # (ANC)       3.0       Gran %       55.9       Hematocrit       32.9       Hemoglobin       11.0       Immature Grans (Abs)       0.01  Comment: Mild elevation in immature granulocytes is non specific and   can be seen in a variety of conditions including stress response,   acute inflammation, trauma and pregnancy. Correlation with other   laboratory and clinical findings is essential.         Immature Granulocytes       0.2       Ketones, UA Negative             Leukocytes, UA Negative             Lymph #       1.7       Lymph %       32.5       Magnesium       1.9       MCH       29.9       MCHC       33.4       MCV       89       Mono #       0.5       Mono %       8.5       MPV       10.5       NITRITE UA Negative             nRBC       0       Occult Blood UA Negative             pH, UA 6.0             Platelets       251       POC Glucose     103         Potassium       3.5       PROTEIN TOTAL       6.5       Protein, UA Negative  Comment: Recommend a 24 hour urine protein or a urine   protein/creatinine ratio if globulin induced proteinuria is  clinically suspected.               RBC       3.68       RDW       15.0       Sodium       135       Specific [place], UA  1.005             Specimen UA Urine, Clean Catch             Troponin I High Sensitivity   3.2  Comment: Troponin results differ between methods. Do not use   results between Troponin methods interchangeably.    Alkaline Phospatase levels above 400 U/L may   cause false positive results.    Access hsTnI should not be used for patients taking   Asfotase kj (Strensiq).       3.0  Comment: Troponin results differ between methods. Do not use   results between Troponin methods interchangeably.    Alkaline Phospatase levels above 400 U/L may   cause false positive results.    Access hsTnI should not be used for patients taking   Asfotase kj (Strensiq).         UROBILINOGEN UA Negative             WBC       5.32               Significant Imaging: I have reviewed all pertinent imaging results/findings within the past 24 hours.    Assessment/Plan:     * Symptomatic bradycardia  Complain of dizziness, lightheadedness, heart rate 47-52  Tele monitor   We will hold amiodarone for now  IV fluids  Orthostatics blood pressure  Consider consulting Cardiology  CMP/CBC  Echo    Paroxysmal A-fib  Patient with Long standing persistent (>12 months) atrial fibrillation which is controlled currently with Amiodarone. Patient is currently in atrial fibrillation.[id number] Score: 2. HASBLED Score: 4. Anticoagulation indicated. Anticoagulation done with Xarelto .    Continue Xarelto  Tele monitoring  Managed outpatient by Cardiology    Essential hypertension  Chronic/controlled  Vital signs q.4  We will hold blood pressure medications for now due to systolic blood pressure being soft and reassess for blood pressure medication to be continued      VTE Risk Mitigation (From admission, onward)         Ordered     IP VTE HIGH RISK PATIENT  Once         06/28/23 0235     Place sequential compression device  Until discontinued         06/28/23 0235                     On 06/28/2023, patient should be placed in hospital observation services  under my care in collaboration with Dr. Dominga Kwong, NELLIE  Department of Hospital Medicine  [facility] - Emergency Dept

## 2023-06-28 NOTE — ASSESSMENT & PLAN NOTE
Patient with Long standing persistent (>12 months) atrial fibrillation which is controlled currently with Amiodarone. Patient is currently in atrial fibrillation.[id number] Score: 2. HASBLED Score: 4. Anticoagulation indicated. Anticoagulation done with Xarelto .    Continue Xarelto  Tele monitoring  Managed outpatient by Cardiology

## 2023-06-28 NOTE — PHARMACY MED REC
"              .Admission Medication History     The home medication history was taken by Ousmane Locke.    You may go to "Admission" then "Reconcile Home Medications" tabs to review and/or act upon these items.     The home medication list has been updated by the Pharmacy department.   Please read ALL comments highlighted in yellow.   Please address this information as you see fit.    Feel free to contact us if you have any questions or require assistance.      The medications listed below were removed from the home medication list. Please reorder if appropriate:  Patient reports no longer taking the following medication(s):  Citalopram 20 mg    Medications listed below were obtained from: Patient/family and Analytic software- [employer]  No current facility-administered medications on file prior to encounter.     Current Outpatient Medications on File Prior to Encounter   Medication Sig Dispense Refill    amiodarone (PACERONE) 200 MG Tab Take 200 mg by mouth once daily.      amLODIPine (NORVASC) 5 MG tablet Take 5 mg by mouth once daily.      cyanocobalamin 1,000 mcg/mL injection Inject 1,000 mcg into the muscle every 30 days.      ferrous sulfate (FEOSOL) 325 mg (65 mg iron) Tab tablet Take 1 tablet by mouth once daily.      lisinopriL 10 MG tablet Take 10 mg by mouth once daily.      magnesium oxide (MAG-OX) 400 mg (241.3 mg magnesium) tablet Take 1 tablet by mouth once daily.      potassium chloride (K-TAB) 20 mEq Take 20 mEq by mouth once daily.      rivaroxaban (XARELTO) 20 mg Tab Take 1 tablet (20 mg total) by mouth daily with dinner or evening meal. 30 tablet 3    chlorthalidone (HYGROTEN) 25 MG Tab Take 25 mg by mouth once daily.      [DISCONTINUED] citalopram (CELEXA) 20 MG tablet Take 1 tablet (20 mg total) by mouth once daily. 30 tablet 0    [DISCONTINUED] flecainide (TAMBOCOR) 100 MG Tab Take 1 tablet (100 mg total) by mouth every 12 (twelve) hours. (Patient not taking: No sig reported) 60 tablet 11    " [DISCONTINUED] hydroCHLOROthiazide (HYDRODIURIL) 25 MG tablet TAKE 1 TABLET BY MOUTH EVERY DAY 30 tablet 3    [DISCONTINUED] metoprolol succinate (TOPROL-XL) 25 MG 24 hr tablet Take 1 tablet (25 mg total) by mouth once daily. 30 tablet 11           Ousmane Locke  EXT 1924

## 2023-06-28 NOTE — PT/OT/SLP EVAL
Physical Therapy Evaluation and Discharge Note    Patient Name:  Paula Elmore   MRN:  [id number]    Recommendations:     Discharge Recommendations: home  Discharge Equipment Recommendations: none   Barriers to discharge:  medical status    Assessment:     Paula Elmore is a 67 y.o. female admitted with a medical diagnosis of Symptomatic bradycardia. .  At this time, patient is functioning at their prior level of function and does not require further acute PT services.     Pt seen in ED. Pt agreeable to visit. Pt performed mobility from the stretcher with supervision and ambulated in the room with no AD x 90 ft with no loss of balance or shortness of breath. HR at start of mobility low 50s increased to 78 bpm with ambulation.     Recent Surgery: * No surgery found *      Plan:     During this hospitalization, patient does not require further acute PT services.  Please re-consult if situation changes.      Subjective     Chief Complaint: fatigue  Patient/Family Comments/goals: return home to see cardiologist outpatient  Pain/Comfort:  Pain Rating 1: 0/10    Patients cultural, spiritual, Evangelical conflicts given the current situation: no    Living Environment:  Pt lives alone in a one story home with no LAURA. Independent with no AD occ RW use for very long distance ambulation (+)   Prior to admission, patients level of function was Independent.  Equipment used at home: none.  DME owned (not currently used): none.  Upon discharge, patient will have assistance from friends.    Objective:     Communicated with RN prior to session.  Patient found HOB elevated with peripheral IV, telemetry, blood pressure cuff, pulse ox (continuous) upon PT entry to room.    General Precautions: Standard, other (see comments) (none)    Orthopedic Precautions: N/A    Braces: N/A  Respiratory Status: Room air    Exams:  Cognitive Exam:  Patient is oriented to Person, Place, Time, and Situation  RLE ROM: WFL  RLE Strength: WFL  LLE  ROM: WFL  LLE Strength: WFL    Functional Mobility:  Bed Mobility:     Supine to Sit: supervision  Sit to Supine: supervision  Transfers:     Sit to Stand:  supervision with no AD  Gait: 90 ft with no AD and supervision    AM-PAC 6 CLICK MOBILITY  Total Score:22       Treatment and Education:  Pt educated on POC, discharge recommendation, need for assist with mobility, pacing/energy conservation, importance of time OOB, use of call bell to seek assistance as needed and fall prevention      AM-PAC 6 CLICK MOBILITY  Total Score:22     Patient left HOB elevated with all lines intact and call button in reach.    GOALS:   Multidisciplinary Problems       Physical Therapy Goals       Not on file                    History:     Past Medical History:   Diagnosis Date    Afib 2021    Afib     Anticoagulant long-term use     Arthritis     Carpal tunnel syndrome     Cataracts, bilateral     Hypertension     Peripheral neuropathy     Seasonal allergies     SVT (supraventricular tachycardia) 2021       Past Surgical History:   Procedure Laterality Date    APPENDECTOMY      CATARACT EXTRACTION       SECTION, CLASSIC      CHOLECYSTECTOMY      CORONARY ANGIOGRAPHY N/A 2022    Procedure: ANGIOGRAM, CORONARY ARTERY;  Surgeon: Ortiz Jarrell MD;  Location: [facility] CATH/EP LAB;  Service: Cardiology;  Laterality: N/A;    LEFT HEART CATHETERIZATION Left 2022    Procedure: CATHETERIZATION, HEART, LEFT;  Surgeon: Ortiz Jarrell MD;  Location: [facility] CATH/EP LAB;  Service: Cardiology;  Laterality: Left;       Time Tracking:     PT Received On: 23  PT Start Time: 1047     PT Stop Time: 1103  PT Total Time (min): 16 min     Billable Minutes: Evaluation 8 and Gait Training 8      2023

## 2023-06-28 NOTE — ED PROVIDER NOTES
Encounter Date: 2023       History     Chief Complaint   Patient presents with    Dizziness     States was in heat all day in car with no air conditioning. States she is worried she is dehydrated     Headache     CHIEF COMPLAINT IS SLIGHT SHORTNESS OF BREATH SENSATION SLIGHT LIGHTHEADEDNESS DIZZINESS FEELING DRAINED.  PATIENT HAS A MILD HEADACHE.  SHE HAD BEEN EXPOSED TO LOT OF HEAT TODAY WHILE TRYING TO GET HER AIR CONDITIONER CAR SERVICE.  SHE DENIES ANY OTHER COMPLAINTS OF FEVER CHILLS SORE THROAT NO PRODUCTIVE COUGH NO CHEST PAIN NO VOMITING OR DIARRHEA NO TROUBLE URINATING.  SHE DOES HAVE A HISTORY OF AFIB WAS CONCERNED HER RHYTHM MAY BE FAST.  IT IS NOT.      Review of patient's allergies indicates:   Allergen Reactions    Celebrex [celecoxib] Other (See Comments)     Headaches     Past Medical History:   Diagnosis Date    Afib 2021    Afib     Arthritis     Carpal tunnel syndrome     Cataracts, bilateral     Hypertension     Peripheral neuropathy     Seasonal allergies     SVT (supraventricular tachycardia) 2021     Past Surgical History:   Procedure Laterality Date    APPENDECTOMY      CATARACT EXTRACTION       SECTION, CLASSIC      CHOLECYSTECTOMY      CORONARY ANGIOGRAPHY N/A 2022    Procedure: ANGIOGRAM, CORONARY ARTERY;  Surgeon: Ortiz Jarrell MD;  Location: [facility] CATH/EP LAB;  Service: Cardiology;  Laterality: N/A;    LEFT HEART CATHETERIZATION Left 2022    Procedure: CATHETERIZATION, HEART, LEFT;  Surgeon: Ortiz Jarrell MD;  Location: [facility] CATH/EP LAB;  Service: Cardiology;  Laterality: Left;     Family History   Problem Relation Age of Onset    Breast cancer Mother     Heart disease Mother     Cancer Mother     Heart disease Father      Social History     Tobacco Use    Smoking status: Never    Smokeless tobacco: Never   Substance Use Topics    Alcohol use: No    Drug use: No     Review of Systems   Constitutional:  Negative for chills and fever.   HENT:  Negative  for ear pain, rhinorrhea and sore throat.    Eyes:  Negative for pain and visual disturbance.   Respiratory:  Negative for cough and shortness of breath.    Cardiovascular:  Negative for chest pain and palpitations.   Gastrointestinal:  Negative for abdominal pain, constipation, diarrhea, nausea and vomiting.   Genitourinary:  Negative for dysuria, frequency, hematuria and urgency.   Musculoskeletal:  Negative for back pain, joint swelling and myalgias.   Skin:  Negative for rash.   Neurological:  Positive for dizziness and light-headedness. Negative for seizures, weakness and headaches.   Psychiatric/Behavioral:  Negative for dysphoric mood. The patient is not nervous/anxious.      Physical Exam     Initial Vitals [06/27/23 2022]   BP Pulse Resp Temp SpO2   122/73 62 18 98.3 °F (36.8 °C) 98 %      MAP       --         Physical Exam    Nursing note and vitals reviewed.  Constitutional: She appears well-developed and well-nourished.   HENT:   Head: Normocephalic and atraumatic.   Eyes: Conjunctivae, EOM and lids are normal. Pupils are equal, round, and reactive to light.   Neck: Trachea normal. Neck supple. No thyroid mass and no thyromegaly present.   Normal range of motion.  Cardiovascular:  Normal rate, regular rhythm and normal heart sounds.           Pulmonary/Chest: Effort normal and breath sounds normal.   Abdominal: Abdomen is soft. There is no abdominal tenderness.   Musculoskeletal:         General: Normal range of motion.      Cervical back: Normal range of motion and neck supple.     Neurological: She is alert and oriented to person, place, and time. She has normal strength and normal reflexes. No cranial nerve deficit or sensory deficit.   Skin: Skin is warm and dry.   Psychiatric: She has a normal mood and affect. Her speech is normal and behavior is normal. Judgment and thought content normal.       ED Course   Procedures  Labs Reviewed   CBC W/ AUTO DIFFERENTIAL - Abnormal; Notable for the following  components:       Result Value    RBC 3.68 (*)     Hemoglobin 11.0 (*)     Hematocrit 32.9 (*)     RDW 15.0 (*)     All other components within normal limits   COMPREHENSIVE METABOLIC PANEL - Abnormal; Notable for the following components:    Sodium 135 (*)     Alkaline Phosphatase 47 (*)     All other components within normal limits   URINALYSIS, REFLEX TO URINE CULTURE - Abnormal; Notable for the following components:    Color, UA Colorless (*)     All other components within normal limits    Narrative:     Specimen Source->Urine   MAGNESIUM   TROPONIN I HIGH SENSITIVITY   B-TYPE NATRIURETIC PEPTIDE   TROPONIN I HIGH SENSITIVITY   POCT GLUCOSE          Imaging Results              X-Ray Chest AP Portable (In process)                      Medications   0.9%  NaCl infusion (0 mLs Intravenous Stopped 6/27/23 9438)     Medical Decision Making:   ED Management:  Patient here is bradycardic to 42 with symptoms.  Discussed the case with the hospitalist patient is to be admitted for evaluation possible drug adjustment.                        Clinical Impression:   Final diagnoses:  [R07.9] Chest pain               Evan Pope MD  06/28/23 7437

## 2023-06-28 NOTE — HPI
Patient is a 67-year-old female with history of AFib on Xarelto, amiodarone, hypertension.  She presents to the ED with complaints of weakness, lightheadedness, dizziness, frontal headache and dizzy on standing.  On evaluation in ED heart rate 47-52, troponin 3.2, BNP 34,  blood pressure soft, dizziness upon standing.  IV fluid bolus 1 L ordered in ED.    On assessment patient is alert oriented x3 but complains about increased weakness and lightheadedness on standing.  She reports prior to coming to ED she had increased weakness in all her extremities.  Patient states she is been driving around in the car with no air condition for 2 hours with more and felt increased weakness and dizziness today.  She reports on Monday her heart rate was normal but noticed last couple of days her blood pressure has been lower than normal.  She reports a cardiologist is Dr. Jarrell who address possible pacemaker placement.  She denies chest pain, dyspnea, nausea, vomiting, fever, chills.  She denies alcohol use, illicit drugs, smoking.    Hospitalist asked to admit for symptomatic bradycardia, echo, maintenance IV fluid., orthostatic blood pressure

## 2023-06-28 NOTE — PLAN OF CARE
[facility] - Emergency Dept  Initial Discharge Assessment       Primary Care Provider: Missy Liu MD    Admission Diagnosis: Chest pain [R07.9]    Admission Date: 6/27/2023  Expected Discharge Date: 6/28/2023    Transition of Care Barriers: None    Payor: [employer] / Plan: [employer] MEDICARE HMO / Product Type: Medicare Advantage /     Extended Emergency Contact Information  Primary Emergency Contact: Estella Malin  Mobile Phone: [phone]  Relation: Friend  Preferred language: English   needed? No  Secondary Emergency Contact: Marsha Narayan   [place]  Mobile Phone: [phone]  Relation: Daughter    Discharge Plan A: Home  Discharge Plan B: Home with family      [id number] - [place], [place] - [address]  [address]  [place] [address]  Phone: [phone] Fax: [phone]    [id number] - [place], [place] - [address]  [address]  [facility] [address]  Phone: [phone] Fax: [phone]    [id number] - [place], [place] - [address]  [address]  [facility] [address]  Phone: [phone] Fax: [phone]    SW attempted to meet with patient at bedside to complete discharge planning assessment without success.  Patient discharge prior to this SW completing assessment.  SW spoke to patient via telephone who states she has no needs at this time and has an appointment with Dr. Jarrell tomorrow.    Initial Assessment (most recent)       Adult Discharge Assessment - 06/28/23 1512          Discharge Assessment    Assessment Type Discharge Planning Assessment     Confirmed/corrected address, phone number and insurance Yes     Confirmed Demographics Correct on Facesheet     Source of Information patient     Communicated DUNIA with patient/caregiver Yes     Facility Arrived From: home     Do you expect to return to your current living situation? Yes     Do you have help at home or someone to help you manage  your care at home? Yes     Who are your caregiver(s) and their phone number(s)? friend     Prior to hospitilization cognitive status: Alert/Oriented     Current cognitive status: Alert/Oriented     Readmission within 30 days? No     Patient currently being followed by outpatient case management? No     Do you currently have service(s) that help you manage your care at home? No     Do you take prescription medications? Yes     Do you have prescription coverage? Yes     Do you have any problems affording any of your prescribed medications? No     Is the patient taking medications as prescribed? yes     Who is going to help you get home at discharge? friend     How do you get to doctors appointments? car, drives self     Are you on dialysis? No     Do you take coumadin? No     Discharge Plan A Home     Discharge Plan B Home with family     DME Needed Upon Discharge  none     Discharge Plan discussed with: Patient     Transition of Care Barriers None

## 2023-06-28 NOTE — ASSESSMENT & PLAN NOTE
Chronic/controlled  Vital signs q.4  We will hold blood pressure medications for now due to systolic blood pressure being soft and reassess for blood pressure medication to be continued

## 2023-06-28 NOTE — PLAN OF CARE
06/28/23 1517   Final Note   Assessment Type Final Discharge Note   Anticipated Discharge Disposition Home   What phone number can be called within the next 1-3 days to see how you are doing after discharge? [phone]   Hospital Resources/Appts/Education Provided Community resources provided;Provided patient/caregiver with written discharge plan information;Provided education on problems/symptoms using teachback

## 2023-06-29 LAB
AORTIC ROOT ANNULUS: 2.9 CM
AORTIC VALVE CUSP SEPERATION: 1.7 CM
AV INDEX (PROSTH): 0.71
AV MEAN GRADIENT: 7 MMHG
AV PEAK GRADIENT: 13 MMHG
AV VALVE AREA: 2.22 CM2
AV VELOCITY RATIO: 0.82
BSA FOR ECHO PROCEDURE: 1.95 M2
CV ECHO LV RWT: 0.6 CM
DOP CALC AO PEAK VEL: 1.83 M/S
DOP CALC AO VTI: 47.8 CM
DOP CALC LVOT AREA: 3.1 CM2
DOP CALC LVOT DIAMETER: 2 CM
DOP CALC LVOT PEAK VEL: 1.5 M/S
DOP CALC LVOT STROKE VOLUME: 106.13 CM3
DOP CALC MV VTI: 41.8 CM
DOP CALCLVOT PEAK VEL VTI: 33.8 CM
E WAVE DECELERATION TIME: 257 MSEC
E/A RATIO: 1.23
E/E' RATIO: 11.5 M/S
ECHO LV POSTERIOR WALL: 1.32 CM (ref 0.6–1.1)
EJECTION FRACTION: 62 %
FRACTIONAL SHORTENING: 32 % (ref 28–44)
INTERVENTRICULAR SEPTUM: 1.12 CM (ref 0.6–1.1)
LEFT INTERNAL DIMENSION IN SYSTOLE: 2.97 CM (ref 2.1–4)
LEFT VENTRICLE DIASTOLIC VOLUME INDEX: 45.9 ML/M2
LEFT VENTRICLE DIASTOLIC VOLUME: 86.29 ML
LEFT VENTRICLE MASS INDEX: 103 G/M2
LEFT VENTRICLE SYSTOLIC VOLUME INDEX: 18.2 ML/M2
LEFT VENTRICLE SYSTOLIC VOLUME: 34.15 ML
LEFT VENTRICULAR INTERNAL DIMENSION IN DIASTOLE: 4.37 CM (ref 3.5–6)
LEFT VENTRICULAR MASS: 193.93 G
LV LATERAL E/E' RATIO: 11.5 M/S
LV SEPTAL E/E' RATIO: 11.5 M/S
LVOT MG: 4 MMHG
LVOT MV: 0.89 CM/S
MV MEAN GRADIENT: 1 MMHG
MV PEAK A VEL: 0.75 M/S
MV PEAK E VEL: 0.92 M/S
MV PEAK GRADIENT: 3 MMHG
MV VALVE AREA BY CONTINUITY EQUATION: 2.54 CM2
PISA MRMAX VEL: 2.15 M/S
PV MV: 0.5 M/S
PV PEAK VELOCITY: 0.75 CM/S
RA PRESSURE: 3 MMHG
TDI LATERAL: 0.08 M/S
TDI SEPTAL: 0.08 M/S
TDI: 0.08 M/S
TRICUSPID ANNULAR PLANE SYSTOLIC EXCURSION: 2.48 CM

## 2023-06-29 NOTE — DISCHARGE SUMMARY
[facility] - Emergency Dept  Hospital Medicine  Discharge Summary      Patient Name: Paula Elmore  MRN: [id number]  VIET: [id number]  Patient Class: OP- Observation  Admission Date: 6/27/2023  Hospital Length of Stay: 0 days  Discharge Date and Time: 6/28/2023  1:48 PM  Attending Physician: No att. providers found   Discharging Provider: Bishnu Beasley MD  Primary Care Provider: Missy Liu MD    Primary Care Team: Networked reference to record PCT     HPI:   Patient is a 67-year-old female with history of AFib on Xarelto, amiodarone, hypertension.  She presents to the ED with complaints of weakness, lightheadedness, dizziness, frontal headache and dizzy on standing.  On evaluation in ED heart rate 47-52, troponin 3.2, BNP 34,  blood pressure soft, dizziness upon standing.  IV fluid bolus 1 L ordered in ED.    On assessment patient is alert oriented x3 but complains about increased weakness and lightheadedness on standing.  She reports prior to coming to ED she had increased weakness in all her extremities.  Patient states she is been driving around in the car with no air condition for 2 hours with more and felt increased weakness and dizziness today.  She reports on Monday her heart rate was normal but noticed last couple of days her blood pressure has been lower than normal.  She reports a cardiologist is Dr. Jarrell who address possible pacemaker placement.  She denies chest pain, dyspnea, nausea, vomiting, fever, chills.  She denies alcohol use, illicit drugs, smoking.    Hospitalist asked to admit for symptomatic bradycardia, echo, maintenance IV fluid., orthostatic blood pressure      * No surgery found *      Hospital Course:   67 year old female with parox afib on Amiodarone, Xarelto presents to the ED feeling weak with mild HA.  She was in a car without AC for multiple hours today.  She has also noted at home that SBP has been running in the low 100s when it normally is SBP  119-120.  HR is mostly in the low 50s. She denies any recent changes in her medication. She has been eating and drinking well. She is going to the gym.  In the ED on admit, HR in the low 40s, SBP low 100s. She is dizzy on changing positions suggesting orthostasis and SBP did go from 149 to 118 from sitting to standing.  She received 1L NS in the ED and IVFs ordered overnight but does not appear to be have been given per MAR.  I've ordered a NS bolus instead, then.  Her home medication of Amiodarone, Norvasc, Chlorthalidone, Lisinopril have been held.  I've consulted Cardiology for bradycardia though I do see she was admitted for the same in 7/2022.  At that time, her Amiodarone was held and she followed up with Dr. Jarrell. She tells me she wasn't sure why her Amiodarone was held and thus was started back on it without issue until now. We discussed that her Amiodarone is now being placed on hold for her low heart rate.  I've been told Dr. Jarrell is not seeing patients at [facility] and thus I have consulted Cardiology on call.  PT has been consulted to assist in ambulating Mrs. Elmore to see how she is going to feel. I have ordered a TSH which returned as normal.  Other labs are WNL including troponin x 2.  Echo has been performed with read pending.  EKG with sinus bradycardia.  I did reach out to Dr. Jarrell who recommended stopping the Amiodarone and he will see her in clinic in 2 days time.  She prefers to go home and follow up in clinic. HR now consistently in the 50s and SBP increased to 120s.  I discussed stopping her Amiodarone and holding her BP meds for another day.  If SBP is 120 or greater tomorrow, she can resume her home medication. If still less than 120, I have recommended continuing to hold them until seen by Dr. Jarrell. She voices understanding. Examined on day of discharge and alert, NAD, comfortable respirations on room air. Return precautions given.        Goals of Care Treatment Preferences:  Code  Status: Full Code      Consults:     No new Assessment & Plan notes have been filed under this hospital service since the last note was generated.  Service: Hospital Medicine    Final Active Diagnoses:    Diagnosis Date Noted POA    Paroxysmal A-fib [I48.0] 02/21/2022 Yes     Chronic    Essential hypertension [I10] 01/17/2019 Yes     Chronic      Problems Resolved During this Admission:    Diagnosis Date Noted Date Resolved POA    PRINCIPAL PROBLEM:  Symptomatic bradycardia [R00.1] 06/28/2023 06/28/2023 Yes       Discharged Condition: good    Disposition: Home or Self Care    Follow Up:   Follow-up Information     Ortiz Jarrell MD Follow up.    Specialties: Cardiology, Interventional Cardiology  Why: follow up this week as scheduled  Contact information:  [address] [place] [address] [id number] [phone]                       Patient Instructions:      Diet diabetic     Notify your health care provider if you experience any of the following:  increased confusion or weakness     Notify your health care provider if you experience any of the following:  persistent dizziness, light-headedness, or visual disturbances     Activity as tolerated       Significant Diagnostic Studies: Labs:   CMP   Recent Labs   Lab 06/27/23 2113 06/28/23  0831   * 140   K 3.5 3.6    107   CO2 24 26   GLU 98 100   BUN 17 21   CREATININE 0.9 0.8   CALCIUM 8.8 8.8   PROT 6.5 6.2   ALBUMIN 3.6 3.4*   BILITOT 0.5 0.5   ALKPHOS 47* 48*   AST 16 15   ALT 12 12   ANIONGAP 8 7*    and CBC   Recent Labs   Lab 06/27/23 2113 06/28/23  0831   WBC 5.32 5.20   HGB 11.0* 10.5*   HCT 32.9* 31.0*    231       Pending Diagnostic Studies:     Procedure Component Value Units Date/Time    Echo [[id number]]  (Abnormal) Resulted: 06/28/23 0903    Order Status: Sent Lab Status: In process Updated: 06/28/23 0905     BSA 1.95 m2      TDI SEPTAL 0.08 m/s      LV LATERAL E/E' RATIO 11.50 m/s      LV SEPTAL E/E' RATIO 11.50 m/s       Left Ventricular Outflow Tract Mean Velocity 0.89 cm/s      Left Ventricular Outflow Tract Mean Gradient 4.00 mmHg      Pulmonary Valve Mean Velocity 0.50 m/s      AORTIC VALVE CUSP SEPERATION 1.70 cm      TDI LATERAL 0.08 m/s      PV PEAK VELOCITY 0.75 cm/s      LVIDd 4.37 cm      IVS 1.12 cm      Posterior Wall 1.32 cm      Ao root annulus 2.90 cm      LVIDs 2.97 cm      FS 32 %      LV mass 193.93 g      TAPSE 2.48 cm      Left Ventricle Relative Wall Thickness 0.60 cm      AV mean gradient 7 mmHg      AV valve area 2.22 cm2      AV Velocity Ratio 0.82     AV index (prosthetic) 0.71     MV mean gradient 1 mmHg      MV valve area by continuity eq 2.54 cm2      E/A ratio 1.23     Mean e' 0.08 m/s      E wave deceleration time 257.00 msec      LVOT diameter 2.00 cm      LVOT area 3.1 cm2      LVOT peak chris 1.50 m/s      LVOT peak VTI 33.80 cm      Ao peak chris 1.83 m/s      Ao VTI 47.8 cm      Mr max chris 2.15 m/s      LVOT stroke volume 106.13 cm3      AV peak gradient 13 mmHg      MV peak gradient 3 mmHg      E/E' ratio 11.50 m/s      MV Peak E Chris 0.92 m/s      MV VTI 41.8 cm      MV Peak A Chris 0.75 m/s      LV Systolic Volume 34.15 mL      LV Systolic Volume Index 18.2 mL/m2      LV Diastolic Volume 86.29 mL      LV Diastolic Volume Index 45.90 mL/m2      LV Mass Index 103 g/m2     Narrative:      · Concentric hypertrophy and       Impression:               Medications:  Reconciled Home Medications:      Medication List      CONTINUE taking these medications    amLODIPine 5 MG tablet  Commonly known as: NORVASC  Take 5 mg by mouth once daily.     chlorthalidone 25 MG Tab  Commonly known as: HYGROTEN  Take 25 mg by mouth once daily.     cyanocobalamin 1,000 mcg/mL injection  Inject 1,000 mcg into the muscle every 30 days.     ferrous sulfate 325 mg (65 mg iron) Tab tablet  Commonly known as: FEOSOL  Take 1 tablet by mouth once daily.     lisinopriL 10 MG tablet  Take 10 mg by mouth once daily.     magnesium oxide  400 mg (241.3 mg magnesium) tablet  Commonly known as: MAG-OX  Take 1 tablet by mouth once daily.     potassium chloride 20 mEq  Commonly known as: K-TAB  Take 20 mEq by mouth once daily.     rivaroxaban 20 mg Tab  Commonly known as: XARELTO  Take 1 tablet (20 mg total) by mouth daily with dinner or evening meal.        STOP taking these medications    amiodarone 200 MG Tab  Commonly known as: PACERONE            Indwelling Lines/Drains at time of discharge:   Lines/Drains/Airways     None                 Time spent on the discharge of patient: 33 minutes         Bishnu Beasley MD  Department of Hospital Medicine  [facility] - Emergency Dept

## 2023-06-29 NOTE — HOSPITAL COURSE
67 year old female with parox afib on Amiodarone, Xarelto presents to the ED feeling weak with mild HA.  She was in a car without AC for multiple hours today.  She has also noted at home that SBP has been running in the low 100s when it normally is -120.  HR is mostly in the low 50s. She denies any recent changes in her medication. She has been eating and drinking well. She is going to the gym.  In the ED on admit, HR in the low 40s, SBP low 100s. She is dizzy on changing positions suggesting orthostasis and SBP did go from 149 to 118 from sitting to standing.  She received 1L NS in the ED and IVFs ordered overnight but does not appear to be have been given per MAR.  I've ordered a NS bolus instead, then.  Her home medication of Amiodarone, Norvasc, Chlorthalidone, Lisinopril have been held.  I've consulted Cardiology for bradycardia though I do see she was admitted for the same in 7/2022.  At that time, her Amiodarone was held and she followed up with Dr. Jarrell. She tells me she wasn't sure why her Amiodarone was held and thus was started back on it without issue until now. We discussed that her Amiodarone is now being placed on hold for her low heart rate.  I've been told Dr. Jarrell is not seeing patients at [facility] and thus I have consulted Cardiology on call.  PT has been consulted to assist in ambulating Mrs. Elmore to see how she is going to feel. I have ordered a TSH which returned as normal.  Other labs are WNL including troponin x 2.  Echo has been performed with read pending.  EKG with sinus bradycardia.  I did reach out to Dr. Jarrell who recommended stopping the Amiodarone and he will see her in clinic in 2 days time.  She prefers to go home and follow up in clinic. HR now consistently in the 50s and SBP increased to 120s.  I discussed stopping her Amiodarone and holding her BP meds for another day.  If SBP is 120 or greater tomorrow, she can resume her home medication. If still less than 120, I  have recommended continuing to hold them until seen by Dr. Jarrell. She voices understanding. Examined on day of discharge and alert, NAD, comfortable respirations on room air. Return precautions given.

## 2023-07-06 ENCOUNTER — TELEPHONE (OUTPATIENT)
Dept: PAIN MEDICINE | Facility: CLINIC | Age: 68
End: 2023-07-06
Payer: COMMERCIAL

## 2023-09-18 PROBLEM — E87.1 HYPONATREMIA: Status: ACTIVE | Noted: 2023-09-18

## 2023-09-18 PROBLEM — D64.9 ANEMIA: Status: ACTIVE | Noted: 2023-09-18

## 2023-09-18 PROBLEM — Z71.89 ACP (ADVANCE CARE PLANNING): Status: ACTIVE | Noted: 2023-09-18

## 2023-09-18 PROBLEM — D50.9 MICROCYTIC ANEMIA: Status: ACTIVE | Noted: 2023-09-18

## 2023-09-20 ENCOUNTER — TELEPHONE (OUTPATIENT)
Dept: HEMATOLOGY/ONCOLOGY | Facility: CLINIC | Age: 68
End: 2023-09-20
Payer: COMMERCIAL

## 2023-09-20 NOTE — TELEPHONE ENCOUNTER
----- Message from Yesenia Doe, Patient Care Assistant sent at 2023 10:00 AM CDT -----  Regardin week follow up  Type: Needs Medical Advice  Who Called:  francisco jacobo  Best Call Back Number: [phone]    Additional Information: luisa is needing a 2 week hospital follow up , please call to further discuss, thank you

## 2023-09-20 NOTE — TELEPHONE ENCOUNTER
Attempt to call pt to schedule new pt hosp f/u for dx of acute anemia.    Unable to leave a voice mail.    Pt D/C from hospital today 9/20/23.

## 2023-09-21 ENCOUNTER — TELEPHONE (OUTPATIENT)
Dept: HEMATOLOGY/ONCOLOGY | Facility: CLINIC | Age: 68
End: 2023-09-21
Payer: COMMERCIAL

## 2023-09-21 NOTE — TELEPHONE ENCOUNTER
Returned call to pt.   Pt was having a pace maker but her Hbg was too low, surgery postpone, and admitted to hosp.    Scheduled pt with benign hem, but then realized pt is out of Net with [employer] insurance.    Pt will inform her cardiologist of the insurance issue and find a hem in her net work.

## 2023-09-21 NOTE — TELEPHONE ENCOUNTER
----- Message from Kelsey Bland sent at 9/20/2023  5:24 PM CDT -----  Contact: Pt  Type:  Patient Returning Call    Who Called:Pt  Who Left Message for Patient:Sandra   Does the patient know what this is regarding?:yes  Would the patient rather a call back or a response via [facility]? call  Best Call Back Number:[phone]  Additional Information: Pt is returning Sandra's call. Please call pt back to advise.

## 2024-06-14 DIAGNOSIS — Z12.31 SCREENING MAMMOGRAM FOR HIGH-RISK PATIENT: Primary | ICD-10-CM

## (undated) DEVICE — GUIDEWIRE DOUBLE ENDED .035 DIA. 150CML

## (undated) DEVICE — SHEATH INTRODUCER SLENDER 6FX10CM

## (undated) DEVICE — CATHETER RADIAL TIG 4.0 OPTITORQUE 5X110

## (undated) DEVICE — HEMOSTAT VASC BAND REG 24CM

## (undated) DEVICE — CATHETER DIAGNOSTIC DXTERITY 5F PIGST

## (undated) DEVICE — GUIDEWIRE J TIP EXCHANGE FIXED CORE 260